# Patient Record
Sex: FEMALE | Race: WHITE | NOT HISPANIC OR LATINO | Employment: FULL TIME | URBAN - METROPOLITAN AREA
[De-identification: names, ages, dates, MRNs, and addresses within clinical notes are randomized per-mention and may not be internally consistent; named-entity substitution may affect disease eponyms.]

---

## 2023-04-03 PROBLEM — R42 DIZZINESS: Status: ACTIVE | Noted: 2023-04-03

## 2023-04-03 PROBLEM — G56.20 ULNAR NERVE IMPINGEMENT: Status: ACTIVE | Noted: 2023-04-03

## 2023-04-03 PROBLEM — M32.9 SLE (SYSTEMIC LUPUS ERYTHEMATOSUS) (MULTI): Status: ACTIVE | Noted: 2023-04-03

## 2023-04-03 PROBLEM — R92.8 ABNORMAL MAMMOGRAM: Status: ACTIVE | Noted: 2023-04-03

## 2023-04-03 PROBLEM — K04.7 TOOTH ABSCESS: Status: ACTIVE | Noted: 2023-04-03

## 2023-04-03 PROBLEM — H90.A31 MIXED CONDUCTIVE AND SENSORINEURAL HEARING LOSS OF RIGHT EAR WITH RESTRICTED HEARING OF LEFT EAR: Status: ACTIVE | Noted: 2023-04-03

## 2023-04-03 PROBLEM — H80.91 OTOSCLEROSIS OF RIGHT EAR: Status: ACTIVE | Noted: 2023-04-03

## 2023-04-03 PROBLEM — M79.643 HAND PAIN: Status: ACTIVE | Noted: 2023-04-03

## 2023-04-03 PROBLEM — U07.1 COVID-19: Status: ACTIVE | Noted: 2023-04-03

## 2023-04-03 PROBLEM — H11.32 SUBCONJUNCTIVAL HEMORRHAGE OF LEFT EYE: Status: ACTIVE | Noted: 2023-04-03

## 2023-04-03 PROBLEM — M54.12 CERVICAL RADICULOPATHY, ACUTE: Status: ACTIVE | Noted: 2023-04-03

## 2023-04-03 PROBLEM — E78.5 HYPERLIPIDEMIA: Status: ACTIVE | Noted: 2023-04-03

## 2023-04-03 PROBLEM — I10 BENIGN ESSENTIAL HYPERTENSION: Status: ACTIVE | Noted: 2023-04-03

## 2023-04-03 PROBLEM — M25.50 JOINT ACHE: Status: ACTIVE | Noted: 2023-04-03

## 2023-04-03 PROBLEM — J06.9 ACUTE UPPER RESPIRATORY INFECTION: Status: ACTIVE | Noted: 2023-04-03

## 2023-04-03 PROBLEM — J30.2 SEASONAL ALLERGIES: Status: ACTIVE | Noted: 2023-04-03

## 2023-04-03 PROBLEM — E03.9 HYPOTHYROIDISM: Status: ACTIVE | Noted: 2023-04-03

## 2023-04-03 PROBLEM — H90.11 CONDUCTIVE HEARING LOSS IN RIGHT EAR: Status: ACTIVE | Noted: 2023-04-03

## 2023-04-03 RX ORDER — CYCLOBENZAPRINE HCL 10 MG
1 TABLET ORAL NIGHTLY PRN
COMMUNITY
Start: 2019-03-20 | End: 2024-04-04 | Stop reason: SDUPTHER

## 2023-04-03 RX ORDER — LOSARTAN POTASSIUM 100 MG/1
1 TABLET ORAL DAILY
COMMUNITY
Start: 2020-02-27 | End: 2024-01-12

## 2023-04-03 RX ORDER — HYDROXYCHLOROQUINE SULFATE 200 MG/1
1 TABLET, FILM COATED ORAL 2 TIMES DAILY
COMMUNITY
Start: 2022-09-08 | End: 2023-10-16

## 2023-04-03 RX ORDER — OLOPATADINE HYDROCHLORIDE 1 MG/ML
SOLUTION/ DROPS OPHTHALMIC
COMMUNITY

## 2023-04-03 RX ORDER — LEVOTHYROXINE SODIUM 150 UG/1
TABLET ORAL
COMMUNITY
End: 2023-05-23

## 2023-04-03 RX ORDER — ESTRADIOL AND NORETHINDRONE ACETATE .5; .1 MG/1; MG/1
TABLET ORAL
COMMUNITY
End: 2023-10-26 | Stop reason: SDUPTHER

## 2023-04-03 RX ORDER — PHENTERMINE HYDROCHLORIDE 37.5 MG/1
1 TABLET ORAL DAILY
COMMUNITY
Start: 2021-05-11 | End: 2023-04-11 | Stop reason: ALTCHOICE

## 2023-04-03 RX ORDER — DOXYCYCLINE HYCLATE 50 MG/1
TABLET, FILM COATED ORAL 2 TIMES DAILY
COMMUNITY
End: 2023-06-27 | Stop reason: ALTCHOICE

## 2023-04-03 RX ORDER — AMOXICILLIN 500 MG/1
1 CAPSULE ORAL 3 TIMES DAILY
COMMUNITY
Start: 2022-12-02 | End: 2023-04-11 | Stop reason: ALTCHOICE

## 2023-04-03 RX ORDER — MULTIVITAMIN
1 TABLET ORAL DAILY
COMMUNITY
End: 2023-10-26 | Stop reason: ALTCHOICE

## 2023-04-03 RX ORDER — ATORVASTATIN CALCIUM 10 MG/1
1 TABLET, FILM COATED ORAL DAILY
COMMUNITY
Start: 2014-11-05 | End: 2023-09-18

## 2023-04-03 RX ORDER — ASPIRIN 81 MG/1
1 TABLET ORAL DAILY
COMMUNITY

## 2023-04-11 ENCOUNTER — OFFICE VISIT (OUTPATIENT)
Dept: PRIMARY CARE | Facility: CLINIC | Age: 58
End: 2023-04-11
Payer: COMMERCIAL

## 2023-04-11 VITALS
SYSTOLIC BLOOD PRESSURE: 138 MMHG | RESPIRATION RATE: 16 BRPM | WEIGHT: 202 LBS | OXYGEN SATURATION: 99 % | TEMPERATURE: 97.3 F | HEIGHT: 70 IN | HEART RATE: 62 BPM | BODY MASS INDEX: 28.92 KG/M2 | DIASTOLIC BLOOD PRESSURE: 80 MMHG

## 2023-04-11 DIAGNOSIS — E66.3 OVERWEIGHT (BMI 25.0-29.9): ICD-10-CM

## 2023-04-11 DIAGNOSIS — E78.2 MIXED HYPERLIPIDEMIA: Primary | ICD-10-CM

## 2023-04-11 DIAGNOSIS — I10 BENIGN ESSENTIAL HYPERTENSION: ICD-10-CM

## 2023-04-11 DIAGNOSIS — E03.9 HYPOTHYROIDISM, UNSPECIFIED TYPE: ICD-10-CM

## 2023-04-11 DIAGNOSIS — Z12.11 COLON CANCER SCREENING: ICD-10-CM

## 2023-04-11 DIAGNOSIS — M32.9 SYSTEMIC LUPUS ERYTHEMATOSUS, UNSPECIFIED SLE TYPE, UNSPECIFIED ORGAN INVOLVEMENT STATUS (MULTI): ICD-10-CM

## 2023-04-11 PROBLEM — R42 DIZZINESS: Status: RESOLVED | Noted: 2023-04-03 | Resolved: 2023-04-11

## 2023-04-11 PROBLEM — E78.00 PURE HYPERCHOLESTEROLEMIA: Status: RESOLVED | Noted: 2023-04-11 | Resolved: 2023-04-11

## 2023-04-11 PROBLEM — M25.50 JOINT ACHE: Status: RESOLVED | Noted: 2023-04-03 | Resolved: 2023-04-11

## 2023-04-11 PROBLEM — K04.7 TOOTH ABSCESS: Status: RESOLVED | Noted: 2023-04-03 | Resolved: 2023-04-11

## 2023-04-11 PROBLEM — U07.1 COVID-19: Status: RESOLVED | Noted: 2023-04-03 | Resolved: 2023-04-11

## 2023-04-11 PROBLEM — H11.32 SUBCONJUNCTIVAL HEMORRHAGE OF LEFT EYE: Status: RESOLVED | Noted: 2023-04-03 | Resolved: 2023-04-11

## 2023-04-11 PROBLEM — M79.643 HAND PAIN: Status: RESOLVED | Noted: 2023-04-03 | Resolved: 2023-04-11

## 2023-04-11 PROBLEM — M54.12 CERVICAL RADICULOPATHY, ACUTE: Status: RESOLVED | Noted: 2023-04-03 | Resolved: 2023-04-11

## 2023-04-11 PROBLEM — R92.8 ABNORMAL MAMMOGRAM: Status: RESOLVED | Noted: 2023-04-03 | Resolved: 2023-04-11

## 2023-04-11 PROBLEM — E78.00 PURE HYPERCHOLESTEROLEMIA: Status: ACTIVE | Noted: 2023-04-11

## 2023-04-11 PROBLEM — J06.9 ACUTE UPPER RESPIRATORY INFECTION: Status: RESOLVED | Noted: 2023-04-03 | Resolved: 2023-04-11

## 2023-04-11 PROCEDURE — 3079F DIAST BP 80-89 MM HG: CPT | Performed by: INTERNAL MEDICINE

## 2023-04-11 PROCEDURE — 99214 OFFICE O/P EST MOD 30 MIN: CPT | Performed by: INTERNAL MEDICINE

## 2023-04-11 PROCEDURE — 3075F SYST BP GE 130 - 139MM HG: CPT | Performed by: INTERNAL MEDICINE

## 2023-04-11 PROCEDURE — 1036F TOBACCO NON-USER: CPT | Performed by: INTERNAL MEDICINE

## 2023-04-11 RX ORDER — PHENTERMINE HYDROCHLORIDE 37.5 MG/1
37.5 TABLET ORAL DAILY
Qty: 30 TABLET | Refills: 0 | Status: SHIPPED | OUTPATIENT
Start: 2023-04-11 | End: 2023-04-13 | Stop reason: SDUPTHER

## 2023-04-11 ASSESSMENT — ENCOUNTER SYMPTOMS: NECK PAIN: 1

## 2023-04-11 ASSESSMENT — PATIENT HEALTH QUESTIONNAIRE - PHQ9
2. FEELING DOWN, DEPRESSED OR HOPELESS: NOT AT ALL
SUM OF ALL RESPONSES TO PHQ9 QUESTIONS 1 AND 2: 0
1. LITTLE INTEREST OR PLEASURE IN DOING THINGS: NOT AT ALL

## 2023-04-11 NOTE — PROGRESS NOTES
"Patient here for a follow up    Subjective   Patient ID: Gladys Norris is a 58 y.o. female who presents for Follow-up.  She is generally doing well today.    The patient has been taking hydroxychloroquine 200 mg BID since 10/2022 and has been tolerating the medication well.  She states that she is remembers to take her medication about \"90%\" of the time.  She has had to reschedule her appointment for an eye exam as her Specialist is retiring.  She continues to follow with  from Rheumatology.    The patient continues to take cyclobenzaprine 10mg as needed for neck pain, which is helping.     The patient had a suspected tooth abscess which was successfully treated with amoxicillin.    The patient would like to restart phentermine 37.5 mg once daily to assist with weight loss.     The patient has a prescription for a ProAir inhaler that she has had to use two times to date during a previous episode of Covid-19.      The patient is up to date with her Shingrix series vaccine and recalls experiencing malaise for two days after each dose.      Review of Systems   Musculoskeletal:  Positive for neck pain.   All other systems reviewed and are negative.      Objective   Physical Exam  Constitutional:       Appearance: Normal appearance.   Cardiovascular:      Rate and Rhythm: Normal rate and regular rhythm.      Heart sounds: Normal heart sounds.   Pulmonary:      Effort: Pulmonary effort is normal.      Breath sounds: Normal breath sounds.   Abdominal:      General: Bowel sounds are normal.      Palpations: Abdomen is soft.      Tenderness: There is no abdominal tenderness.   Skin:     General: Skin is warm and dry.   Neurological:      General: No focal deficit present.      Mental Status: She is alert and oriented to person, place, and time. Mental status is at baseline.   Psychiatric:         Mood and Affect: Mood normal.         Behavior: Behavior normal.       Assessment/Plan       IMPRESSION/PLAN :    "   Hypothyroid   - Last TSH in normal range at 0.45 - 07/2022, maintained on levothyroxine 150mcg QD. Ordered TSH.    HLD   - Stable, continue on atorvastatin 10mg QD.  Ordered lipid panel.    HTN   - /80 in office today, continue on Losartan 100mg 1/2 tab daily.      Carpal Tunnel Syndrome   - I have advised the patient to try to wear a carpal tunnel wrist splint to see if this helps with her symptom.      SLE   - Labs showed a weakly positive BENITA but Rheumatoid Factor, CRP, and citrulline antibody were unremarkable and ESR was cancelled by the lab on 7/2022. Started on hydroxychloroquine 200 mg BID on 10/3/2022, and experienced nausea w/o vomiting 1 week later. Follows up with  in Rheumatology.     Sinus Congestion   - Start xclear 2 sprays in each nostril once a day. Start Xyzal 5 mg 1 tablet QD.     Hematochezia   - Likely due to hemorrhoids and constipation. Monitor for now, and advised the patient to call the clinic if symptoms return. Patient is due for repeat colonoscopy in 2023.     Health Maintenance   - Routine labs 12/2022. Last Pap wnl 7/2022, repeat due 7/2023. Last Mammogram 1/2023. Last colonoscopy performed 12/2018, ordered repeat for 2023.     Follow up in 6 months, call sooner if needed.        Scribe Attestation  By signing my name below, IIbeth , Conner   attest that this documentation has been prepared under the direction and in the presence of Zaheer Buchanan DO.

## 2023-04-13 RX ORDER — PHENTERMINE HYDROCHLORIDE 37.5 MG/1
37.5 TABLET ORAL DAILY
Qty: 30 TABLET | Refills: 0 | Status: SHIPPED | OUTPATIENT
Start: 2023-04-13 | End: 2023-05-31 | Stop reason: SDUPTHER

## 2023-05-15 ENCOUNTER — APPOINTMENT (OUTPATIENT)
Dept: PRIMARY CARE | Facility: CLINIC | Age: 58
End: 2023-05-15
Payer: COMMERCIAL

## 2023-05-18 ENCOUNTER — LAB (OUTPATIENT)
Dept: LAB | Facility: LAB | Age: 58
End: 2023-05-18
Payer: COMMERCIAL

## 2023-05-18 DIAGNOSIS — E78.2 MIXED HYPERLIPIDEMIA: ICD-10-CM

## 2023-05-18 DIAGNOSIS — E03.9 HYPOTHYROIDISM, UNSPECIFIED TYPE: ICD-10-CM

## 2023-05-18 LAB
ANION GAP IN SER/PLAS: 13 MMOL/L (ref 10–20)
ANTI-DNA (DS): 9 IU/ML
BASOPHILS (10*3/UL) IN BLOOD BY AUTOMATED COUNT: 0.04 X10E9/L (ref 0–0.1)
BASOPHILS/100 LEUKOCYTES IN BLOOD BY AUTOMATED COUNT: 0.8 % (ref 0–2)
CALCIUM (MG/DL) IN SER/PLAS: 10 MG/DL (ref 8.6–10.3)
CARBON DIOXIDE, TOTAL (MMOL/L) IN SER/PLAS: 29 MMOL/L (ref 21–32)
CHLORIDE (MMOL/L) IN SER/PLAS: 104 MMOL/L (ref 98–107)
CHOLESTEROL (MG/DL) IN SER/PLAS: 184 MG/DL (ref 0–199)
CHOLESTEROL IN HDL (MG/DL) IN SER/PLAS: 79.5 MG/DL
CHOLESTEROL/HDL RATIO: 2.3
COMPLEMENT C3 (MG/DL) IN SER/PLAS: 158 MG/DL (ref 87–200)
COMPLEMENT C4 (MG/DL) IN SER/PLAS: 34 MG/DL (ref 10–50)
CREATININE (MG/DL) IN SER/PLAS: 1.06 MG/DL (ref 0.5–1.05)
CREATININE (MG/DL) IN URINE: 210 MG/DL (ref 20–320)
EOSINOPHILS (10*3/UL) IN BLOOD BY AUTOMATED COUNT: 0.17 X10E9/L (ref 0–0.7)
EOSINOPHILS/100 LEUKOCYTES IN BLOOD BY AUTOMATED COUNT: 3.6 % (ref 0–6)
ERYTHROCYTE DISTRIBUTION WIDTH (RATIO) BY AUTOMATED COUNT: 12.8 % (ref 11.5–14.5)
ERYTHROCYTE MEAN CORPUSCULAR HEMOGLOBIN CONCENTRATION (G/DL) BY AUTOMATED: 33.1 G/DL (ref 32–36)
ERYTHROCYTE MEAN CORPUSCULAR VOLUME (FL) BY AUTOMATED COUNT: 93 FL (ref 80–100)
ERYTHROCYTES (10*6/UL) IN BLOOD BY AUTOMATED COUNT: 4.65 X10E12/L (ref 4–5.2)
GFR FEMALE: 60 ML/MIN/1.73M2
GLUCOSE (MG/DL) IN SER/PLAS: 129 MG/DL (ref 74–99)
HEMATOCRIT (%) IN BLOOD BY AUTOMATED COUNT: 43.2 % (ref 36–46)
HEMOGLOBIN (G/DL) IN BLOOD: 14.3 G/DL (ref 12–16)
IMMATURE GRANULOCYTES/100 LEUKOCYTES IN BLOOD BY AUTOMATED COUNT: 0.4 % (ref 0–0.9)
LDL: 88 MG/DL (ref 0–99)
LEUKOCYTES (10*3/UL) IN BLOOD BY AUTOMATED COUNT: 4.8 X10E9/L (ref 4.4–11.3)
LYMPHOCYTES (10*3/UL) IN BLOOD BY AUTOMATED COUNT: 1.43 X10E9/L (ref 1.2–4.8)
LYMPHOCYTES/100 LEUKOCYTES IN BLOOD BY AUTOMATED COUNT: 30.1 % (ref 13–44)
MONOCYTES (10*3/UL) IN BLOOD BY AUTOMATED COUNT: 0.34 X10E9/L (ref 0.1–1)
MONOCYTES/100 LEUKOCYTES IN BLOOD BY AUTOMATED COUNT: 7.2 % (ref 2–10)
NEUTROPHILS (10*3/UL) IN BLOOD BY AUTOMATED COUNT: 2.75 X10E9/L (ref 1.2–7.7)
NEUTROPHILS/100 LEUKOCYTES IN BLOOD BY AUTOMATED COUNT: 57.9 % (ref 40–80)
PLATELETS (10*3/UL) IN BLOOD AUTOMATED COUNT: 335 X10E9/L (ref 150–450)
POTASSIUM (MMOL/L) IN SER/PLAS: 4 MMOL/L (ref 3.5–5.3)
PROTEIN (MG/DL) IN URINE: 18 MG/DL (ref 5–24)
PROTEIN/CREATININE (MG/MG) IN URINE: 0.09 MG/MG CREAT (ref 0–0.17)
SEDIMENTATION RATE, ERYTHROCYTE: 2 MM/H (ref 0–30)
SODIUM (MMOL/L) IN SER/PLAS: 142 MMOL/L (ref 136–145)
THYROTROPIN (MIU/L) IN SER/PLAS BY DETECTION LIMIT <= 0.05 MIU/L: 3.2 MIU/L (ref 0.44–3.98)
TRIGLYCERIDE (MG/DL) IN SER/PLAS: 82 MG/DL (ref 0–149)
UREA NITROGEN (MG/DL) IN SER/PLAS: 13 MG/DL (ref 6–23)
VLDL: 16 MG/DL (ref 0–40)

## 2023-05-18 PROCEDURE — 84443 ASSAY THYROID STIM HORMONE: CPT

## 2023-05-18 PROCEDURE — 36415 COLL VENOUS BLD VENIPUNCTURE: CPT

## 2023-05-18 PROCEDURE — 80061 LIPID PANEL: CPT

## 2023-05-23 ENCOUNTER — OFFICE VISIT (OUTPATIENT)
Dept: PRIMARY CARE | Facility: CLINIC | Age: 58
End: 2023-05-23
Payer: COMMERCIAL

## 2023-05-23 VITALS
BODY MASS INDEX: 29.4 KG/M2 | TEMPERATURE: 97.6 F | SYSTOLIC BLOOD PRESSURE: 148 MMHG | OXYGEN SATURATION: 99 % | RESPIRATION RATE: 16 BRPM | HEART RATE: 67 BPM | WEIGHT: 202 LBS | DIASTOLIC BLOOD PRESSURE: 70 MMHG

## 2023-05-23 DIAGNOSIS — E66.3 OVERWEIGHT (BMI 25.0-29.9): ICD-10-CM

## 2023-05-23 DIAGNOSIS — R73.9 HYPERGLYCEMIA: ICD-10-CM

## 2023-05-23 DIAGNOSIS — E03.9 HYPOTHYROIDISM, UNSPECIFIED TYPE: Primary | ICD-10-CM

## 2023-05-23 PROCEDURE — 1036F TOBACCO NON-USER: CPT | Performed by: INTERNAL MEDICINE

## 2023-05-23 PROCEDURE — 99214 OFFICE O/P EST MOD 30 MIN: CPT | Performed by: INTERNAL MEDICINE

## 2023-05-23 PROCEDURE — 3077F SYST BP >= 140 MM HG: CPT | Performed by: INTERNAL MEDICINE

## 2023-05-23 PROCEDURE — 3078F DIAST BP <80 MM HG: CPT | Performed by: INTERNAL MEDICINE

## 2023-05-23 RX ORDER — LEVOTHYROXINE SODIUM 175 UG/1
175 TABLET ORAL DAILY
Qty: 30 TABLET | Refills: 11 | Status: SHIPPED | OUTPATIENT
Start: 2023-05-23 | End: 2023-05-23 | Stop reason: SDUPTHER

## 2023-05-23 RX ORDER — LEVOTHYROXINE SODIUM 175 UG/1
175 TABLET ORAL DAILY
Qty: 30 TABLET | Refills: 11 | Status: SHIPPED | OUTPATIENT
Start: 2023-05-23 | End: 2023-06-15

## 2023-05-23 NOTE — PROGRESS NOTES
Patient here for a 1 month follow up    Subjective   Patient ID: Gladys Norris is a 58 y.o. female who presents for Follow-up.    The patient is concerned about a weight gain of 8lbs since 12/2022 which she believes may be related to hypothyroidism.  Her last TSH from 5/18/2023 was within the normal range although it did increase by three points when compared to previous.  She is maintained with levothyroxine 150 mcg every day and has been tolerating this dosage well.    The patient recalls she was fasting prior to completing her most recent BMP which showed a FBG of 129 mg/dL on 5/18/2023      Review of Systems   All other systems reviewed and are negative.      Objective   Physical Exam  Constitutional:       Appearance: Normal appearance.   Cardiovascular:      Rate and Rhythm: Normal rate and regular rhythm.      Heart sounds: Normal heart sounds.   Pulmonary:      Effort: Pulmonary effort is normal.      Breath sounds: Normal breath sounds.   Abdominal:      General: Bowel sounds are normal.      Palpations: Abdomen is soft.      Tenderness: There is no abdominal tenderness.   Skin:     General: Skin is warm and dry.   Neurological:      General: No focal deficit present.      Mental Status: She is alert and oriented to person, place, and time. Mental status is at baseline.   Psychiatric:         Mood and Affect: Mood normal.         Behavior: Behavior normal.         Assessment/Plan       IMPRESSION/PLAN :      Hypothyroid   - Last TSH in normal range at 3.45 - 5/2023.  Increased by nearly 3.0 points since last reading.  Increase levothyroxine from 150mcg to 175 mcg every day.  Ordered repeat TSH.     Hyperglycemia  - Last  mg/dL per 5/18/2023.  Ordered repeat BMP to be completed in the fasting state and HbA1c.    HTN   - /70 in office today, continue on Losartan 100mg 1/2 tab daily.      HLD   - Stable, continue on atorvastatin 10mg QD.  Ordered lipid panel.    Carpal Tunnel Syndrome   - I have  advised the patient to try to wear a carpal tunnel wrist splint to see if this helps with her symptom.      SLE   - Labs showed a weakly positive BENITA but Rheumatoid Factor, CRP, and citrulline antibody were unremarkable and ESR was cancelled by the lab on 7/2022. Started on hydroxychloroquine 200 mg BID on 10/3/2022, and experienced nausea w/o vomiting 1 week later. Follows up with  in Rheumatology.     Sinus Congestion   - Start xclear 2 sprays in each nostril once a day. Start Xyzal 5 mg 1 tablet QD.      Hematochezia   - Likely due to hemorrhoids and constipation. Monitor for now, and advised the patient to call the clinic if symptoms return. Patient is due for repeat colonoscopy in 2023.     Health Maintenance   - Routine labs 5/2023. Last Pap wnl 7/2022, repeat due 7/2023. Last Mammogram 1/2023. Last colonoscopy performed 12/2018, ordered repeat for 2023.     Follow up in 6 months, call sooner if needed.        Scribe Attestation  By signing my name below, I, Conner Daniels   attest that this documentation has been prepared under the direction and in the presence of Zaheer Buchanan DO.

## 2023-05-31 DIAGNOSIS — E66.3 OVERWEIGHT (BMI 25.0-29.9): ICD-10-CM

## 2023-05-31 RX ORDER — PHENTERMINE HYDROCHLORIDE 37.5 MG/1
37.5 TABLET ORAL DAILY
Qty: 30 TABLET | Refills: 0 | Status: SHIPPED | OUTPATIENT
Start: 2023-05-31 | End: 2023-07-25 | Stop reason: ALTCHOICE

## 2023-06-15 DIAGNOSIS — E03.9 HYPOTHYROIDISM, UNSPECIFIED TYPE: ICD-10-CM

## 2023-06-15 RX ORDER — LEVOTHYROXINE SODIUM 175 UG/1
175 TABLET ORAL DAILY
Qty: 30 TABLET | Refills: 11 | Status: SHIPPED | OUTPATIENT
Start: 2023-06-15 | End: 2023-07-31

## 2023-06-23 ENCOUNTER — APPOINTMENT (OUTPATIENT)
Dept: PRIMARY CARE | Facility: CLINIC | Age: 58
End: 2023-06-23
Payer: COMMERCIAL

## 2023-06-27 ENCOUNTER — OFFICE VISIT (OUTPATIENT)
Dept: PRIMARY CARE | Facility: CLINIC | Age: 58
End: 2023-06-27
Payer: COMMERCIAL

## 2023-06-27 VITALS
BODY MASS INDEX: 28.92 KG/M2 | DIASTOLIC BLOOD PRESSURE: 72 MMHG | TEMPERATURE: 97.2 F | HEIGHT: 70 IN | HEART RATE: 61 BPM | RESPIRATION RATE: 16 BRPM | OXYGEN SATURATION: 96 % | SYSTOLIC BLOOD PRESSURE: 124 MMHG | WEIGHT: 202 LBS

## 2023-06-27 DIAGNOSIS — M32.9 SYSTEMIC LUPUS ERYTHEMATOSUS, UNSPECIFIED SLE TYPE, UNSPECIFIED ORGAN INVOLVEMENT STATUS (MULTI): Primary | ICD-10-CM

## 2023-06-27 PROCEDURE — 1036F TOBACCO NON-USER: CPT | Performed by: INTERNAL MEDICINE

## 2023-06-27 PROCEDURE — 99213 OFFICE O/P EST LOW 20 MIN: CPT | Performed by: INTERNAL MEDICINE

## 2023-06-27 PROCEDURE — 3078F DIAST BP <80 MM HG: CPT | Performed by: INTERNAL MEDICINE

## 2023-06-27 PROCEDURE — 3074F SYST BP LT 130 MM HG: CPT | Performed by: INTERNAL MEDICINE

## 2023-06-27 RX ORDER — CHOLECALCIFEROL (VITAMIN D3) 25 MCG
25 TABLET ORAL DAILY
COMMUNITY

## 2023-06-27 ASSESSMENT — PATIENT HEALTH QUESTIONNAIRE - PHQ9
2. FEELING DOWN, DEPRESSED OR HOPELESS: NOT AT ALL
1. LITTLE INTEREST OR PLEASURE IN DOING THINGS: NOT AT ALL
SUM OF ALL RESPONSES TO PHQ9 QUESTIONS 1 AND 2: 0

## 2023-06-27 ASSESSMENT — ENCOUNTER SYMPTOMS
DIARRHEA: 0
CONSTIPATION: 0

## 2023-06-27 NOTE — PROGRESS NOTES
Subjective   Patient ID: Gladys Norris is a 58 y.o. female who presents for Follow-up (1 month follow up).    The patient has not been taking phentermine as she is concerned about elevated creatinine of 1.06 in 5/2023.  She would like to take the medication with reassurance that her renal function is stable.      The patient has been taking the increased dosage of levothyroxine at 175 mcg once daily, and states that the intermittent constipation has resolved.  She denies any bowel problems and notes she is due for a colonoscopy in 12/2023.      Review of Systems   Gastrointestinal:  Negative for constipation and diarrhea.     Objective   Physical Exam  Constitutional:       Appearance: Normal appearance.   Cardiovascular:      Rate and Rhythm: Normal rate and regular rhythm.      Heart sounds: Normal heart sounds.   Pulmonary:      Effort: Pulmonary effort is normal.      Breath sounds: Normal breath sounds.   Abdominal:      General: Bowel sounds are normal.      Palpations: Abdomen is soft.      Tenderness: There is no abdominal tenderness.   Skin:     General: Skin is warm and dry.   Neurological:      General: No focal deficit present.      Mental Status: She is alert and oriented to person, place, and time. Mental status is at baseline.   Psychiatric:         Mood and Affect: Mood normal.         Behavior: Behavior normal.       Assessment/Plan   Problem List Items Addressed This Visit    None      IMPRESSION/PLAN :      HTN   - /72 in office today, continue on Losartan 100mg 1/2 tab daily.      HLD   - Stable, continue on atorvastatin 10mg QD.  Ordered lipid panel.    Hypothyroid   - Last TSH in normal range at 3.45 - 5/2023.  Increased by nearly 3.0 points since last reading.  Increase levothyroxine from 150mcg to 175 mcg every day.  Previously ordered repeat TSH.     Hyperglycemia  - Last  mg/dL per 5/18/2023.  Previously ordered repeat BMP to be completed in the fasting state and HbA1c.      Carpal Tunnel Syndrome   - I have advised the patient to try to wear a carpal tunnel wrist splint to see if this helps with her symptom.      SLE   - Labs showed a weakly positive BENITA but Rheumatoid Factor, CRP, and citrulline antibody were unremarkable and ESR was cancelled by the lab on 7/2022. Started on hydroxychloroquine 200 mg BID on 10/3/2022, and experienced nausea w/o vomiting 1 week later. Follows up with  in Rheumatology.     Sinus Congestion   - Start xclear 2 sprays in each nostril once a day. Start Xyzal 5 mg 1 tablet QD.      Hematochezia   - Likely due to hemorrhoids and constipation. Monitor for now, and advised the patient to call the clinic if symptoms return. Patient is due for repeat colonoscopy in 2023.     Health Maintenance   - Routine labs 5/2023. Last Pap wnl 7/2022, repeat due 7/2023. Last Mammogram 1/2023. Last colonoscopy performed 12/2018, ordered repeat for 2023.     Follow up in 6 months, call sooner if needed.        Scribe Attestation  By signing my name below, I, Conner Daniels   attest that this documentation has been prepared under the direction and in the presence of Zaheer Buchanan DO.

## 2023-07-25 ENCOUNTER — OFFICE VISIT (OUTPATIENT)
Dept: PRIMARY CARE | Facility: CLINIC | Age: 58
End: 2023-07-25
Payer: COMMERCIAL

## 2023-07-25 VITALS
WEIGHT: 202 LBS | OXYGEN SATURATION: 98 % | HEART RATE: 70 BPM | TEMPERATURE: 97.8 F | BODY MASS INDEX: 29.4 KG/M2 | DIASTOLIC BLOOD PRESSURE: 80 MMHG | RESPIRATION RATE: 16 BRPM | SYSTOLIC BLOOD PRESSURE: 128 MMHG

## 2023-07-25 DIAGNOSIS — M25.552 LEFT HIP PAIN: Primary | ICD-10-CM

## 2023-07-25 DIAGNOSIS — M25.561 ACUTE PAIN OF BOTH KNEES: ICD-10-CM

## 2023-07-25 DIAGNOSIS — M25.562 ACUTE PAIN OF BOTH KNEES: ICD-10-CM

## 2023-07-25 PROCEDURE — 3079F DIAST BP 80-89 MM HG: CPT | Performed by: INTERNAL MEDICINE

## 2023-07-25 PROCEDURE — 99396 PREV VISIT EST AGE 40-64: CPT | Performed by: INTERNAL MEDICINE

## 2023-07-25 PROCEDURE — 3074F SYST BP LT 130 MM HG: CPT | Performed by: INTERNAL MEDICINE

## 2023-07-25 PROCEDURE — 1036F TOBACCO NON-USER: CPT | Performed by: INTERNAL MEDICINE

## 2023-07-25 ASSESSMENT — ENCOUNTER SYMPTOMS
FREQUENCY: 0
DIARRHEA: 0
CONSTIPATION: 0

## 2023-07-25 NOTE — PROGRESS NOTES
Patient here for a physical     Subjective   Patient ID: Gladys Norris is a 58 y.o. female who presents for Annual Exam.    The patient reports left hip pain and bilateral knee pain, which is also worse on the left side, particularly when standing from a sitting position on the floor.  She spends a significant amount of time standing while at work, and finds that pain is worse when she goes home in the evening.  The patient suspects that the knee pain is likely due to muscle weakness.  She has tried using topical Voltaren gel with little to no relief.  The patient intends to follow-up with  from Rheumatology.      The patient has stopped taking phentermine 37.5 mg once daily.  She has been taking levothyroxine 175 mcg once daily and finds that this increased dosage is helping.      The patient denies any vision changes and follows regularly with Ophthalmology.  She denies any bowel problems or urinary symptoms.       Review of Systems   Eyes:  Negative for visual disturbance.   Gastrointestinal:  Negative for constipation and diarrhea.   Genitourinary:  Negative for frequency.   Musculoskeletal:         Positive for left hip pain, bilateral knee pain.      Objective   Physical Exam  Constitutional:       Appearance: Normal appearance.   Cardiovascular:      Rate and Rhythm: Normal rate and regular rhythm.      Heart sounds: Normal heart sounds.   Pulmonary:      Effort: Pulmonary effort is normal.      Breath sounds: Normal breath sounds.   Abdominal:      General: Bowel sounds are normal.      Palpations: Abdomen is soft.      Tenderness: There is no abdominal tenderness.   Skin:     General: Skin is warm and dry.   Neurological:      General: No focal deficit present.      Mental Status: She is alert and oriented to person, place, and time. Mental status is at baseline.   Psychiatric:         Mood and Affect: Mood normal.         Behavior: Behavior normal.       Assessment/Plan   Problem List Items  Addressed This Visit    None  Visit Diagnoses       Left hip pain    -  Primary    Relevant Orders    XR hip left 2 or 3 views    Acute pain of both knees        Relevant Orders    Referral to Physical Therapy            CPE Performed  -  Discussed healthy diet and regular exercise.    -  Physical exam overall unremarkable. Immunizations reviewed and updated accordingly. Healthy lifestyle choices discussed (tobacco avoidance, appropriate alcohol use, avoidance of illicit substances).   -  Patient is wearing seatbelt.   -  Screening lab work ordered as indicated.    -  Age appropriate screening tests reviewed with patient.        IMPRESSION/PLAN :      Left Hip Pain  - Ordered Xray and referral to Physical Therapy.      Bilateral Knee Pain/Weakness  - Ordered referral to physical thearpy.    HTN   - /80 in office today, continue on Losartan 100mg 1/2 tab daily.      HLD   - Stable, continue on atorvastatin 10mg QD.       Hypothyroid   - Last TSH in normal range at 3.45 - 5/2023.  Increased by nearly 3.0 points since last reading.  Increase levothyroxine from 150mcg to 175 mcg every day.  Previously ordered TSH, pending.     Hyperglycemia  - Last  mg/dL per 5/18/2023.  Previously ordered repeat BMP to be completed in the fasting state and HbA1c.     Carpal Tunnel Syndrome   - I have advised the patient to try to wear a carpal tunnel wrist splint to see if this helps with her symptom.      SLE   - Labs showed a weakly positive BENITA but Rheumatoid Factor, CRP, and citrulline antibody were unremarkable and ESR was cancelled by the lab on 7/2022. Started on hydroxychloroquine 200 mg BID on 10/3/2022, and experienced nausea w/o vomiting 1 week later. Follows up with  in Rheumatology.     Hematochezia   - Likely due to hemorrhoids and constipation. Monitor for now, and advised the patient to call the clinic if symptoms return. Patient is due for repeat colonoscopy in 2023.     Health Maintenance   -  Routine labs 5/2023. Last Pap wnl 7/2022, repeat due 7/2023. Last Mammogram 1/2023. Last colonoscopy performed 12/2018, previously ordered repeat for 2023.     Follow up in 6 months, call sooner if needed.        Scribe Attestation  By signing my name below, I, Ibeth Carrera, Scribe   attest that this documentation has been prepared under the direction and in the presence of Zaheer Buchanan DO.

## 2023-07-29 DIAGNOSIS — E03.9 HYPOTHYROIDISM, UNSPECIFIED TYPE: ICD-10-CM

## 2023-07-31 RX ORDER — LEVOTHYROXINE SODIUM 175 UG/1
175 TABLET ORAL DAILY
Qty: 30 TABLET | Refills: 0 | Status: SHIPPED | OUTPATIENT
Start: 2023-07-31 | End: 2023-08-04 | Stop reason: SDUPTHER

## 2023-08-03 DIAGNOSIS — E03.9 HYPOTHYROIDISM, UNSPECIFIED TYPE: ICD-10-CM

## 2023-08-03 RX ORDER — LEVOTHYROXINE SODIUM 175 UG/1
175 TABLET ORAL DAILY
Qty: 30 TABLET | Refills: 0 | Status: CANCELLED | OUTPATIENT
Start: 2023-08-03 | End: 2024-08-02

## 2023-08-04 DIAGNOSIS — E03.9 HYPOTHYROIDISM, UNSPECIFIED TYPE: ICD-10-CM

## 2023-08-04 RX ORDER — LEVOTHYROXINE SODIUM 175 UG/1
175 TABLET ORAL DAILY
Qty: 90 TABLET | Refills: 0 | Status: SHIPPED | OUTPATIENT
Start: 2023-08-04 | End: 2023-11-20 | Stop reason: SDUPTHER

## 2023-08-05 NOTE — RESULT ENCOUNTER NOTE
Gladys    Thank you for doing the x-ray of the left hip.  The radiologist confirms that there is mild arthritis in this hip.  Please continue the measures that you and Dr. Buchanan discussed at your recent visit regarding your hip and other joint symptoms, and call with any additional concerns.    Sincerely,    Jerrell Winkler MD   - covering physician for Dr. Buchanan

## 2023-08-09 ENCOUNTER — LAB (OUTPATIENT)
Dept: LAB | Facility: LAB | Age: 58
End: 2023-08-09
Payer: COMMERCIAL

## 2023-08-09 DIAGNOSIS — E03.9 HYPOTHYROIDISM, UNSPECIFIED TYPE: ICD-10-CM

## 2023-08-09 DIAGNOSIS — R73.9 HYPERGLYCEMIA: ICD-10-CM

## 2023-08-09 LAB
ANION GAP IN SER/PLAS: 12 MMOL/L (ref 10–20)
BASOPHILS (10*3/UL) IN BLOOD BY AUTOMATED COUNT: 0.03 X10E9/L (ref 0–0.1)
BASOPHILS/100 LEUKOCYTES IN BLOOD BY AUTOMATED COUNT: 0.7 % (ref 0–2)
CALCIUM (MG/DL) IN SER/PLAS: 9.6 MG/DL (ref 8.6–10.3)
CARBON DIOXIDE, TOTAL (MMOL/L) IN SER/PLAS: 29 MMOL/L (ref 21–32)
CHLORIDE (MMOL/L) IN SER/PLAS: 106 MMOL/L (ref 98–107)
CREATININE (MG/DL) IN SER/PLAS: 0.99 MG/DL (ref 0.5–1.05)
CREATININE (MG/DL) IN URINE: 161 MG/DL (ref 20–320)
EOSINOPHILS (10*3/UL) IN BLOOD BY AUTOMATED COUNT: 0.17 X10E9/L (ref 0–0.7)
EOSINOPHILS/100 LEUKOCYTES IN BLOOD BY AUTOMATED COUNT: 3.8 % (ref 0–6)
ERYTHROCYTE DISTRIBUTION WIDTH (RATIO) BY AUTOMATED COUNT: 12.7 % (ref 11.5–14.5)
ERYTHROCYTE MEAN CORPUSCULAR HEMOGLOBIN CONCENTRATION (G/DL) BY AUTOMATED: 32.9 G/DL (ref 32–36)
ERYTHROCYTE MEAN CORPUSCULAR VOLUME (FL) BY AUTOMATED COUNT: 94 FL (ref 80–100)
ERYTHROCYTES (10*6/UL) IN BLOOD BY AUTOMATED COUNT: 4.35 X10E12/L (ref 4–5.2)
ESTIMATED AVERAGE GLUCOSE FOR HBA1C: 117 MG/DL
GFR FEMALE: 66 ML/MIN/1.73M2
GLUCOSE (MG/DL) IN SER/PLAS: 105 MG/DL (ref 74–99)
HEMATOCRIT (%) IN BLOOD BY AUTOMATED COUNT: 40.7 % (ref 36–46)
HEMOGLOBIN (G/DL) IN BLOOD: 13.4 G/DL (ref 12–16)
HEMOGLOBIN A1C/HEMOGLOBIN TOTAL IN BLOOD: 5.7 %
IMMATURE GRANULOCYTES/100 LEUKOCYTES IN BLOOD BY AUTOMATED COUNT: 0 % (ref 0–0.9)
LEUKOCYTES (10*3/UL) IN BLOOD BY AUTOMATED COUNT: 4.4 X10E9/L (ref 4.4–11.3)
LYMPHOCYTES (10*3/UL) IN BLOOD BY AUTOMATED COUNT: 1.25 X10E9/L (ref 1.2–4.8)
LYMPHOCYTES/100 LEUKOCYTES IN BLOOD BY AUTOMATED COUNT: 28.2 % (ref 13–44)
MONOCYTES (10*3/UL) IN BLOOD BY AUTOMATED COUNT: 0.35 X10E9/L (ref 0.1–1)
MONOCYTES/100 LEUKOCYTES IN BLOOD BY AUTOMATED COUNT: 7.9 % (ref 2–10)
NEUTROPHILS (10*3/UL) IN BLOOD BY AUTOMATED COUNT: 2.64 X10E9/L (ref 1.2–7.7)
NEUTROPHILS/100 LEUKOCYTES IN BLOOD BY AUTOMATED COUNT: 59.4 % (ref 40–80)
PLATELETS (10*3/UL) IN BLOOD AUTOMATED COUNT: 320 X10E9/L (ref 150–450)
POTASSIUM (MMOL/L) IN SER/PLAS: 4.5 MMOL/L (ref 3.5–5.3)
PROTEIN (MG/DL) IN URINE: 16 MG/DL (ref 5–24)
PROTEIN/CREATININE (MG/MG) IN URINE: 0.1 MG/MG CREAT (ref 0–0.17)
SEDIMENTATION RATE, ERYTHROCYTE: 2 MM/H (ref 0–30)
SODIUM (MMOL/L) IN SER/PLAS: 142 MMOL/L (ref 136–145)
THYROTROPIN (MIU/L) IN SER/PLAS BY DETECTION LIMIT <= 0.05 MIU/L: 1.76 MIU/L (ref 0.44–3.98)
UREA NITROGEN (MG/DL) IN SER/PLAS: 13 MG/DL (ref 6–23)

## 2023-08-09 PROCEDURE — 84443 ASSAY THYROID STIM HORMONE: CPT

## 2023-08-09 PROCEDURE — 83036 HEMOGLOBIN GLYCOSYLATED A1C: CPT

## 2023-08-09 PROCEDURE — 36415 COLL VENOUS BLD VENIPUNCTURE: CPT

## 2023-08-09 PROCEDURE — 80048 BASIC METABOLIC PNL TOTAL CA: CPT

## 2023-08-18 DIAGNOSIS — M25.562 ACUTE PAIN OF LEFT KNEE: Primary | ICD-10-CM

## 2023-08-18 RX ORDER — METHYLPREDNISOLONE 4 MG/1
TABLET ORAL
Qty: 21 TABLET | Refills: 0 | Status: SHIPPED | OUTPATIENT
Start: 2023-08-18 | End: 2023-09-01 | Stop reason: SDUPTHER

## 2023-09-01 DIAGNOSIS — M25.562 ACUTE PAIN OF LEFT KNEE: ICD-10-CM

## 2023-09-01 RX ORDER — METHYLPREDNISOLONE 4 MG/1
TABLET ORAL
Qty: 21 TABLET | Refills: 1 | Status: SHIPPED | OUTPATIENT
Start: 2023-09-01 | End: 2023-09-08

## 2023-09-18 DIAGNOSIS — E78.2 MIXED HYPERLIPIDEMIA: Primary | ICD-10-CM

## 2023-09-18 RX ORDER — ATORVASTATIN CALCIUM 10 MG/1
10 TABLET, FILM COATED ORAL DAILY
Qty: 90 TABLET | Refills: 3 | Status: SHIPPED | OUTPATIENT
Start: 2023-09-18

## 2023-10-16 DIAGNOSIS — M32.9 SYSTEMIC LUPUS ERYTHEMATOSUS, UNSPECIFIED SLE TYPE, UNSPECIFIED ORGAN INVOLVEMENT STATUS (MULTI): Primary | ICD-10-CM

## 2023-10-16 RX ORDER — HYDROXYCHLOROQUINE SULFATE 200 MG/1
TABLET, FILM COATED ORAL 2 TIMES DAILY
Qty: 180 TABLET | Refills: 1 | Status: SHIPPED | OUTPATIENT
Start: 2023-10-16 | End: 2024-04-11

## 2023-10-23 DIAGNOSIS — E03.9 HYPOTHYROIDISM, UNSPECIFIED TYPE: Primary | ICD-10-CM

## 2023-10-26 ENCOUNTER — OFFICE VISIT (OUTPATIENT)
Dept: OBSTETRICS AND GYNECOLOGY | Facility: CLINIC | Age: 58
End: 2023-10-26
Payer: COMMERCIAL

## 2023-10-26 VITALS
HEIGHT: 69 IN | SYSTOLIC BLOOD PRESSURE: 120 MMHG | DIASTOLIC BLOOD PRESSURE: 78 MMHG | BODY MASS INDEX: 29.92 KG/M2 | WEIGHT: 202 LBS

## 2023-10-26 DIAGNOSIS — N95.1 MENOPAUSAL SYMPTOM: ICD-10-CM

## 2023-10-26 DIAGNOSIS — Z01.419 WELL WOMAN EXAM: Primary | ICD-10-CM

## 2023-10-26 DIAGNOSIS — Z12.31 SCREENING MAMMOGRAM FOR BREAST CANCER: ICD-10-CM

## 2023-10-26 PROCEDURE — 1036F TOBACCO NON-USER: CPT | Performed by: OBSTETRICS & GYNECOLOGY

## 2023-10-26 PROCEDURE — 99396 PREV VISIT EST AGE 40-64: CPT | Performed by: OBSTETRICS & GYNECOLOGY

## 2023-10-26 PROCEDURE — 3078F DIAST BP <80 MM HG: CPT | Performed by: OBSTETRICS & GYNECOLOGY

## 2023-10-26 PROCEDURE — 3074F SYST BP LT 130 MM HG: CPT | Performed by: OBSTETRICS & GYNECOLOGY

## 2023-10-26 RX ORDER — ESTRADIOL AND NORETHINDRONE ACETATE .5; .1 MG/1; MG/1
1 TABLET ORAL DAILY
Qty: 90 TABLET | Refills: 3 | Status: SHIPPED | OUTPATIENT
Start: 2023-10-26

## 2023-10-26 ASSESSMENT — ENCOUNTER SYMPTOMS
HEMATOLOGIC/LYMPHATIC NEGATIVE: 1
EYES NEGATIVE: 1
CONSTITUTIONAL NEGATIVE: 1
GASTROINTESTINAL NEGATIVE: 1
CARDIOVASCULAR NEGATIVE: 1
NEUROLOGICAL NEGATIVE: 1
MUSCULOSKELETAL NEGATIVE: 1
ALLERGIC/IMMUNOLOGIC NEGATIVE: 1
RESPIRATORY NEGATIVE: 1
PSYCHIATRIC NEGATIVE: 1
ENDOCRINE NEGATIVE: 1

## 2023-10-26 NOTE — PROGRESS NOTES
Subjective   Patient ID: Gladys Norris is a 58 y.o. female who presents for Annual Exam (LENKA//LAST PAP: 07/28/2022- WNL -HPV/LAST MAMM: 01/26/2023//Chaperone Declined, Mitali Gutierrez, MAII/).  Would like to continue hormone therapy for now.  Health is good.  Kids are in town. Son having a baby.  Just started exercise program.  Sex is not so much since  broke his back. She is ok with it.   Work is the same.        Review of Systems   Constitutional: Negative.    HENT: Negative.     Eyes: Negative.    Respiratory: Negative.     Cardiovascular: Negative.    Gastrointestinal: Negative.    Endocrine: Negative.    Genitourinary: Negative.    Musculoskeletal: Negative.    Skin: Negative.    Allergic/Immunologic: Negative.    Neurological: Negative.    Hematological: Negative.    Psychiatric/Behavioral: Negative.         Objective   Physical Exam  Constitutional:       Appearance: Normal appearance. She is normal weight.   HENT:      Head: Normocephalic.   Neck:      Thyroid: No thyroid mass or thyromegaly.   Pulmonary:      Effort: Pulmonary effort is normal.   Chest:      Chest wall: No mass.   Breasts:     Right: Normal.      Left: Normal.   Abdominal:      Palpations: Abdomen is soft.   Genitourinary:     General: Normal vulva.      Exam position: Lithotomy position.      Vagina: Normal.      Cervix: Normal and dilated.      Uterus: Normal.       Adnexa: Right adnexa normal and left adnexa normal.      Comments: Normal vagina  Normal cervix  Normal uterus  Normal adnexa  Musculoskeletal:         General: Normal range of motion.      Cervical back: Normal range of motion and neck supple.   Lymphadenopathy:      Upper Body:      Right upper body: No supraclavicular or axillary adenopathy.      Left upper body: No supraclavicular or axillary adenopathy.   Skin:     General: Skin is warm and dry.   Neurological:      General: No focal deficit present.      Mental Status: She is alert.   Psychiatric:         Mood and  Affect: Mood normal.         Behavior: Behavior normal.         Thought Content: Thought content normal.         Judgment: Judgment normal.       Assessment/Plan

## 2023-11-06 ENCOUNTER — ANESTHESIA (OUTPATIENT)
Dept: OPERATING ROOM | Facility: CLINIC | Age: 58
End: 2023-11-06
Payer: COMMERCIAL

## 2023-11-06 ENCOUNTER — HOSPITAL ENCOUNTER (OUTPATIENT)
Dept: OPERATING ROOM | Facility: CLINIC | Age: 58
Setting detail: OUTPATIENT SURGERY
Discharge: HOME | End: 2023-11-06
Payer: COMMERCIAL

## 2023-11-06 ENCOUNTER — ANESTHESIA EVENT (OUTPATIENT)
Dept: OPERATING ROOM | Facility: CLINIC | Age: 58
End: 2023-11-06
Payer: COMMERCIAL

## 2023-11-06 VITALS
DIASTOLIC BLOOD PRESSURE: 77 MMHG | TEMPERATURE: 97.9 F | HEART RATE: 55 BPM | SYSTOLIC BLOOD PRESSURE: 130 MMHG | RESPIRATION RATE: 16 BRPM | HEIGHT: 70 IN | WEIGHT: 200.62 LBS | BODY MASS INDEX: 28.72 KG/M2 | OXYGEN SATURATION: 98 %

## 2023-11-06 DIAGNOSIS — Z12.11 COLON CANCER SCREENING: Primary | ICD-10-CM

## 2023-11-06 DIAGNOSIS — Z83.710 FAMILY HISTORY OF ADENOMATOUS AND SERRATED POLYPS: ICD-10-CM

## 2023-11-06 PROCEDURE — 45378 DIAGNOSTIC COLONOSCOPY: CPT | Performed by: INTERNAL MEDICINE

## 2023-11-06 PROCEDURE — 2500000004 HC RX 250 GENERAL PHARMACY W/ HCPCS (ALT 636 FOR OP/ED): Performed by: ANESTHESIOLOGIST ASSISTANT

## 2023-11-06 PROCEDURE — A45378 PR COLONOSCOPY,DIAGNOSTIC: Performed by: ANESTHESIOLOGIST ASSISTANT

## 2023-11-06 PROCEDURE — 7100000009 HC PHASE TWO TIME - INITIAL BASE CHARGE: Performed by: ANESTHESIOLOGY

## 2023-11-06 PROCEDURE — 3700000001 HC GENERAL ANESTHESIA TIME - INITIAL BASE CHARGE: Performed by: ANESTHESIOLOGY

## 2023-11-06 PROCEDURE — 3600000007 HC OR TIME - EACH INCREMENTAL 1 MINUTE - PROCEDURE LEVEL TWO: Performed by: ANESTHESIOLOGY

## 2023-11-06 PROCEDURE — A45378 PR COLONOSCOPY,DIAGNOSTIC: Performed by: ANESTHESIOLOGY

## 2023-11-06 PROCEDURE — 7100000010 HC PHASE TWO TIME - EACH INCREMENTAL 1 MINUTE: Performed by: ANESTHESIOLOGY

## 2023-11-06 PROCEDURE — 3600000002 HC OR TIME - INITIAL BASE CHARGE - PROCEDURE LEVEL TWO: Performed by: ANESTHESIOLOGY

## 2023-11-06 PROCEDURE — 3700000002 HC GENERAL ANESTHESIA TIME - EACH INCREMENTAL 1 MINUTE: Performed by: ANESTHESIOLOGY

## 2023-11-06 RX ORDER — PROPOFOL 10 MG/ML
INJECTION, EMULSION INTRAVENOUS CONTINUOUS PRN
Status: DISCONTINUED | OUTPATIENT
Start: 2023-11-06 | End: 2023-11-06

## 2023-11-06 RX ORDER — SODIUM CHLORIDE, SODIUM LACTATE, POTASSIUM CHLORIDE, CALCIUM CHLORIDE 600; 310; 30; 20 MG/100ML; MG/100ML; MG/100ML; MG/100ML
20 INJECTION, SOLUTION INTRAVENOUS CONTINUOUS
Status: CANCELLED | OUTPATIENT
Start: 2023-11-06

## 2023-11-06 RX ORDER — ONDANSETRON HYDROCHLORIDE 2 MG/ML
INJECTION, SOLUTION INTRAVENOUS AS NEEDED
Status: DISCONTINUED | OUTPATIENT
Start: 2023-11-06 | End: 2023-11-06

## 2023-11-06 RX ADMIN — SODIUM CHLORIDE, SODIUM LACTATE, POTASSIUM CHLORIDE, AND CALCIUM CHLORIDE: .6; .31; .03; .02 INJECTION, SOLUTION INTRAVENOUS at 07:49

## 2023-11-06 RX ADMIN — ONDANSETRON 4 MG: 2 INJECTION INTRAMUSCULAR; INTRAVENOUS at 08:01

## 2023-11-06 RX ADMIN — PROPOFOL 400 MCG/KG/MIN: 10 INJECTION, EMULSION INTRAVENOUS at 07:58

## 2023-11-06 SDOH — HEALTH STABILITY: MENTAL HEALTH: CURRENT SMOKER: 0

## 2023-11-06 ASSESSMENT — COLUMBIA-SUICIDE SEVERITY RATING SCALE - C-SSRS
2. HAVE YOU ACTUALLY HAD ANY THOUGHTS OF KILLING YOURSELF?: NO
6. HAVE YOU EVER DONE ANYTHING, STARTED TO DO ANYTHING, OR PREPARED TO DO ANYTHING TO END YOUR LIFE?: NO
1. IN THE PAST MONTH, HAVE YOU WISHED YOU WERE DEAD OR WISHED YOU COULD GO TO SLEEP AND NOT WAKE UP?: NO

## 2023-11-06 ASSESSMENT — PAIN SCALES - GENERAL
PAINLEVEL_OUTOF10: 0 - NO PAIN

## 2023-11-06 ASSESSMENT — PAIN - FUNCTIONAL ASSESSMENT
PAIN_FUNCTIONAL_ASSESSMENT: 0-10
PAIN_FUNCTIONAL_ASSESSMENT: 0-10

## 2023-11-06 NOTE — PRE-SEDATION DOCUMENTATION
59 yo woman with FH of advanced adenomas    Exam NAD  Chest clear  Cor RRR  Abd benign    For increased risk screening colonoscopy

## 2023-11-06 NOTE — ANESTHESIA POSTPROCEDURE EVALUATION
Patient: Gladys Norris    Procedure Summary       Date: 11/06/23 Room / Location: [facility] OR    Anesthesia Start: 0755 Anesthesia Stop: 0829    Procedure: COLONOSCOPY Diagnosis:       Colon cancer screening      Family history of adenomatous and serrated polyps    Scheduled Providers: Zaheer Funk MD; Teddy Varela MD Responsible Provider: Teddy Varela MD    Anesthesia Type: MAC ASA Status: 3            Anesthesia Type: MAC    Vitals Value Taken Time   /77 11/06/23 0855   Temp 36.6 °C (97.9 °F) 11/06/23 0855   Pulse 55 11/06/23 0855   Resp 16 11/06/23 0855   SpO2 98 % 11/06/23 0855       Anesthesia Post Evaluation    Patient location during evaluation: bedside  Patient participation: complete - patient participated  Level of consciousness: awake  Pain management: satisfactory to patient  Cardiovascular status: acceptable  Respiratory status: acceptable  Hydration status: acceptable  Comments: No nausea or vomiting     Did well        There were no known notable events for this encounter.

## 2023-11-06 NOTE — DISCHARGE INSTRUCTIONS
Patient Instructions after a Colonoscopy      The anesthetics, sedatives or narcotics which were given to you today will be acting in your body for the next 24 hours, so you might feel a little sleepy or groggy.  This feeling should slowly wear off. Carefully read and follow the instructions.     You received sedation today:  - Do not drive or operate any machinery or power tools of any kind.   - No alcoholic beverages today, not even beer or wine.  - Do not make any important decisions or sign any legal documents.  - No over the counter medications that contain alcohol or that may cause drowsiness.  - Do not make any important decisions or sign any legal documents.    While it is common to experience mild to moderate abdominal distention, gas, or belching after your procedure, if any of these symptoms occur following discharge from the GI Lab or within one week of having your procedure, call the [facility] to be advised whether a visit to your nearest Urgent Care or Emergency Department is indicated.  Take this paper with you if you go.     - If you develop an allergic reaction to the medications that were given during your procedure such as difficulty breathing, rash, hives, severe nausea, vomiting or lightheadedness.  - If you experience chest pain, shortness of breath, severe abdominal pain, fevers and chills.  -If you develop signs and symptoms of bleeding such as blood in your spit, if your stools turn black, tarry, or bloody  - If you have not urinated within 8 hours following your procedure.  - If your IV site becomes painful, red, inflamed, or looks infected.    If you received a biopsy/polypectomy/sphincterotomy the following instructions apply below:    __ Do not use Aspirin containing products, non-steroidal medications or anti-coagulants for one week following your procedure. (Examples of these types of medications are: Advil, Arthrotec, Aleve, Coumadin, Ecotrin, Heparin, Ibuprofen,  Indocin, Motrin, Naprosyn, Nuprin, Plavix, Vioxx, and Voltarin, or their generic forms.  This list is not all-inclusive.  Check with your physician or pharmacist before resuming medications.)   __ Eat a soft diet today.  Avoid foods that are poorly digested for the next 24 hours.  These foods would include: nuts, beans, lettuce, red meats, and fried foods. Start with liquids and advance your diet as tolerated, gradually work up to eating solids.   __ Do not have a Barium Study or Enema for one week.    Your physician recommends the additional following instructions:    -You have a contact number available for emergencies. The signs and symptoms of potential delayed complications were discussed with you. You may return to normal activities tomorrow.  -Resume your previous diet.  -Continue your present medications.   -We are waiting for your pathology results.  -Your physician has recommended a repeat colonoscopy (date to be determined after pending pathology results are reviewed) for surveillance based on pathology results.  -The findings and recommendations have been discussed with you.  -The findings and recommendations were discussed with your family.  - Please see Medication Reconciliation Form for new medication/medications prescribed.       If you experience any problems or have any questions following discharge from the GI Lab, please call:    Zaheer Fnuk MD  [phone]      Nurse Signature                                                                        Date___________________                                                                            Patient/Responsible Party Signature                                        Date___________________

## 2023-11-06 NOTE — ANESTHESIA PREPROCEDURE EVALUATION
Patient: Gladys Norris    Procedure Information       Date/Time: 11/06/23 0800    Scheduled providers: Zaheer Funk MD    Procedure: COLONOSCOPY    Location: [facility] OR            Relevant Problems   Cardiovascular   (+) Benign essential hypertension   (+) Hyperlipidemia      Endocrine   (+) Hypothyroidism      Neuro/Psych   (+) Ulnar nerve impingement      Musculoskeletal   (+) SLE (systemic lupus erythematosus) (CMS/HCC)      Eyes, Ears, Nose, and Throat   (+) Conductive hearing loss in right ear   (+) Mixed conductive and sensorineural hearing loss of right ear with restricted hearing of left ear       Clinical information reviewed:   Tobacco  Allergies  Meds  Problems  Med Hx  Surg Hx   Fam Hx          NPO Detail:  No data recorded     Physical Exam    Airway  Mallampati: I  TM distance: >3 FB  Neck ROM: full     Cardiovascular   Rhythm: regular  Rate: normal     Dental    Pulmonary    Abdominal            Anesthesia Plan    ASA 3     MAC   (MAC anesthesia explained, Pt. Agrees, Ok to proceed)  The patient is not a current smoker.  Patient was not previously instructed to abstain from smoking on day of procedure.  Patient did not smoke on day of procedure.      Plan discussed with [facility] and attending.

## 2023-11-07 ASSESSMENT — PAIN SCALES - GENERAL: PAINLEVEL_OUTOF10: 0 - NO PAIN

## 2023-11-07 NOTE — ADDENDUM NOTE
Encounter addended by: Dianne Coates RN on: 11/7/2023 10:31 AM   Actions taken: Contacts section saved, Flowsheet accepted

## 2023-11-10 ENCOUNTER — LAB (OUTPATIENT)
Dept: LAB | Facility: LAB | Age: 58
End: 2023-11-10
Payer: COMMERCIAL

## 2023-11-10 DIAGNOSIS — E03.9 HYPOTHYROIDISM, UNSPECIFIED TYPE: ICD-10-CM

## 2023-11-10 LAB — TSH SERPL-ACNC: 2.1 MIU/L (ref 0.44–3.98)

## 2023-11-10 PROCEDURE — 84443 ASSAY THYROID STIM HORMONE: CPT

## 2023-11-10 PROCEDURE — 36415 COLL VENOUS BLD VENIPUNCTURE: CPT

## 2023-11-20 DIAGNOSIS — E03.9 HYPOTHYROIDISM, UNSPECIFIED TYPE: ICD-10-CM

## 2023-11-20 RX ORDER — LEVOTHYROXINE SODIUM 175 UG/1
175 TABLET ORAL DAILY
Qty: 90 TABLET | Refills: 3 | Status: SHIPPED | OUTPATIENT
Start: 2023-11-20 | End: 2023-12-11

## 2023-11-27 ENCOUNTER — LAB REQUISITION (OUTPATIENT)
Dept: LAB | Facility: HOSPITAL | Age: 58
End: 2023-11-27
Payer: COMMERCIAL

## 2023-11-27 LAB — SARS-COV-2 RNA RESP QL NAA+PROBE: DETECTED

## 2023-11-27 PROCEDURE — 87635 SARS-COV-2 COVID-19 AMP PRB: CPT

## 2023-12-07 ENCOUNTER — TELEMEDICINE (OUTPATIENT)
Dept: RHEUMATOLOGY | Facility: CLINIC | Age: 58
End: 2023-12-07
Payer: COMMERCIAL

## 2023-12-07 VITALS
DIASTOLIC BLOOD PRESSURE: 72 MMHG | HEART RATE: 62 BPM | BODY MASS INDEX: 28.7 KG/M2 | WEIGHT: 200 LBS | SYSTOLIC BLOOD PRESSURE: 118 MMHG

## 2023-12-07 DIAGNOSIS — M32.9 SYSTEMIC LUPUS ERYTHEMATOSUS, UNSPECIFIED SLE TYPE, UNSPECIFIED ORGAN INVOLVEMENT STATUS (MULTI): Primary | ICD-10-CM

## 2023-12-07 PROBLEM — G56.01 RIGHT CARPAL TUNNEL SYNDROME: Status: ACTIVE | Noted: 2023-12-07

## 2023-12-07 PROBLEM — G56.21 CUBITAL TUNNEL SYNDROME, RIGHT: Status: ACTIVE | Noted: 2023-12-07

## 2023-12-07 PROCEDURE — 99214 OFFICE O/P EST MOD 30 MIN: CPT | Performed by: INTERNAL MEDICINE

## 2023-12-07 RX ORDER — METHYLPREDNISOLONE 4 MG/1
TABLET ORAL
COMMUNITY
Start: 2023-11-09 | End: 2024-03-13 | Stop reason: ALTCHOICE

## 2023-12-07 RX ORDER — PHENTERMINE HYDROCHLORIDE 37.5 MG/1
1 TABLET ORAL DAILY
COMMUNITY
Start: 2021-05-11 | End: 2024-03-13 | Stop reason: SDUPTHER

## 2023-12-07 ASSESSMENT — ENCOUNTER SYMPTOMS
DEPRESSION: 0
OCCASIONAL FEELINGS OF UNSTEADINESS: 0
LOSS OF SENSATION IN FEET: 0

## 2023-12-07 NOTE — PROGRESS NOTES
Subjective   Patient ID: Gladys Norris is a 58 y.o. female who presents for Follow-up (Patient tested positive for covid.).    HPI 58 year-old female here for follow-up regarding SLE.   Virtual visit is being done today because of recent COVID infection.  Verbal consent was given for visit.  Patient is at home at the time of the visit.     Joint pain started in about 2018.  Joint pain has worsened over the last 4 to 5 years.  Joint pain seems worse as the day progresses.  She is stiff for 20 to 45 minutes in the morning.  She currently complains of a lot of knee pain, especially when she goes from a sitting to standing position. Pain is especially severe in her left knee.  She complains of left lateral hip pain, but she notes it does not hurt to lay on her hip at nighttime. She has increased pain when she is standing.     X-ray of her left hip done August 1, 2023 shows mild OA.     She gets intermittent sciatica.  She was off work for 1 week July 2022 due to sciatica.  She took a Medrol Dosepak at that point with some relief.     She also uses Motrin 800 mg once or twice weekly.     She started hydroxychloroquine 200 mg twice daily September 2022 for probable SLE.  HCQ does help with pain.     She is Dr. Alvarez regarding tingling and lightening bolts in the left fourth and fifth fingers.  EMG done March 2023 showed mild to moderate carpal tunnel syndrome and ulnar neuropathy.  Dr. Alvarez is not recommending surgery at present, he told her she will know when she is ready.  She is currently going to sleep with a carpal tunnel splint.     She does have mild dry eyes, thinks her distance vision is not quite as good.  She uses over-the-counter artificial tears frequently. She uses refresh as well as a gel at bedtime.  She had a follow-up eye exam with Dr. Solis 2/23.  Follow-up eye exam is scheduled for August 2023.    She did have a probable flare of SLE November 17, 2023.  She developed a malar rash and  widespread joint pain.  Symptoms resolved with taking a Medrol Dosepak.    She also was diagnosed with COVID 2023.  She had a runny nose for 2 weeks prior to developing a sore throat, generalized achiness, congestion and headache.  She was not treated with an antiviral.  Most of her symptoms have resolved.  She is still mildly fatigued and develops occasional headache.     She sometimes gets a malar rash.   She complains of mild dry eyes.  1    Labs 2022: BENITA 1:1 60, double-stranded DNA 12 (positive greater than or equal to 10), SSA and SSB antibodies negative, anti-Coleman and RNP antibodies negative, rheumatoid factor negative, Citrulline antibody negative, CRP less than 0.1, CBC normal, CMP normal except glucose 102, TSH 0.45, cholesterol 151, HDL 68, LDL 70, triglycerides 67.  Labs 2022: C3 and C4 normal, anticardiolipin antibody negative, beta-2 glycoprotein antibody negative, urinalysis normal  Labs 2022: CBC normal except hemoglobin 11.6, BMP normal, double-stranded DNA 12 (+10 or greater), C3 and C4 normal, spot urine protein to creatinine ratio 0.09, lupus anticoagulant negative  Labs : CBC normal, BMP normal except creatinine 1.06 (GFR 60) and glucose 129, dsDNA 9 (equivocal), TSH 3.2, spot urine protein to creatinine ratio 0.09, C3 and C4 normal, cholesterol 184, LDL 88, HDL 79.5, triglycerides 82  Labs 2023: BMP normal, CBC normal, ESR 2, TSH 1.76, hemoglobin A1c 5.7, spot urine protein creatinine ratio 0.1  1    Medical problem list:   -Hypertension   -Hyperlipidemia   -Hypothyroidism   - SLE    Allergies: Reviewed as documented     Surgeries:    Cholecystectomy   Stapedectomy     Social history: .   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   Denies use of tobacco and alcohol..     Family history: Sister has SLE             ROS:  General: Denies fevers or chills. Has some fatigue.  CV: Denies chest pain or palpitations.  Denies leg  edema.  Lungs: Denies coughing or shortness of breath.  Skin: Denies rashes or nodules.  MS: Denies joint pain or joint swelling.     Objective   /72   Pulse 62   Wt 90.7 kg (200 lb)   BMI 28.70 kg/m²     Physical Exam  HEENT: External exam of the eyes and ears is normal.  CV: No apparent edema.  Lungs: Normal respiratory effort.  Neuro: Affect appropriate.  Skin: No malar rash.    Assessment/Plan   Problem List Items Addressed This Visit             ICD-10-CM    SLE (systemic lupus erythematosus) (CMS/Former[facility]) - Primary M32.9         1. SLE- has positive BENITA, positive double-stranded DNA, arthralgias.  She does have mild erythema in the malar eminence, which does not extend into the nasolabial fold. She has telangiectasias on the bridge of her nose, which would be consistent with rosacea. She does note that the rash worsens with sun exposure, which is suggestive of a malar rash with SLE.     She started hydroxychloroquine September 2022, and is having significantly less joint pain.  Follow-up eye exam was done 3/23 with Dr. Solis.    She did have a brief flare November 17, 2023 when she developed a malar rash and generalized arthralgias.  This occurred shortly after doing a colonoscopy prep.  Symptoms resolved with a Medrol Dosepak.     2. BMI 29- stable. She will continue to work on weight loss.     3. Anterior left knee pain-likely patellofemoral OA. Recommend try PT recommend Voltaren gel 4 g 4 times daily as needed..    4.  COVID infection-tested + November 17, 2023.  She still has mild fatigue and occasional headache but is feeling much better and has returned to work.    Plan:  Check CBC with differential, BMP, double-stranded DNA, C3, C4, ESR, spot urine protein to creatinine ratio.  Follow-up in 3 months.

## 2023-12-10 DIAGNOSIS — E03.9 HYPOTHYROIDISM, UNSPECIFIED TYPE: ICD-10-CM

## 2023-12-11 RX ORDER — LEVOTHYROXINE SODIUM 175 UG/1
175 TABLET ORAL
Qty: 90 TABLET | Refills: 0 | Status: SHIPPED | OUTPATIENT
Start: 2023-12-11 | End: 2024-04-04 | Stop reason: SDUPTHER

## 2024-01-02 ENCOUNTER — LAB (OUTPATIENT)
Dept: LAB | Facility: LAB | Age: 59
End: 2024-01-02
Payer: COMMERCIAL

## 2024-01-02 DIAGNOSIS — M32.9 SYSTEMIC LUPUS ERYTHEMATOSUS, UNSPECIFIED SLE TYPE, UNSPECIFIED ORGAN INVOLVEMENT STATUS (MULTI): ICD-10-CM

## 2024-01-02 LAB
ANION GAP SERPL CALC-SCNC: 11 MMOL/L (ref 10–20)
BASOPHILS # BLD AUTO: 0.04 X10*3/UL (ref 0–0.1)
BASOPHILS NFR BLD AUTO: 0.8 %
BUN SERPL-MCNC: 13 MG/DL (ref 6–23)
C3 SERPL-MCNC: 147 MG/DL (ref 87–200)
C4 SERPL-MCNC: 29 MG/DL (ref 10–50)
CALCIUM SERPL-MCNC: 9.4 MG/DL (ref 8.6–10.3)
CHLORIDE SERPL-SCNC: 107 MMOL/L (ref 98–107)
CO2 SERPL-SCNC: 30 MMOL/L (ref 21–32)
CREAT SERPL-MCNC: 0.99 MG/DL (ref 0.5–1.05)
CREAT UR-MCNC: 135.9 MG/DL (ref 20–320)
DSDNA AB SER-ACNC: 9 IU/ML
EOSINOPHIL # BLD AUTO: 0.2 X10*3/UL (ref 0–0.7)
EOSINOPHIL NFR BLD AUTO: 3.9 %
ERYTHROCYTE [DISTWIDTH] IN BLOOD BY AUTOMATED COUNT: 12.7 % (ref 11.5–14.5)
ERYTHROCYTE [SEDIMENTATION RATE] IN BLOOD BY WESTERGREN METHOD: 13 MM/H (ref 0–30)
GFR SERPL CREATININE-BSD FRML MDRD: 66 ML/MIN/1.73M*2
GLUCOSE SERPL-MCNC: 103 MG/DL (ref 74–99)
HCT VFR BLD AUTO: 42.5 % (ref 36–46)
HGB BLD-MCNC: 14 G/DL (ref 12–16)
IMM GRANULOCYTES # BLD AUTO: 0.01 X10*3/UL (ref 0–0.7)
IMM GRANULOCYTES NFR BLD AUTO: 0.2 % (ref 0–0.9)
LYMPHOCYTES # BLD AUTO: 1.61 X10*3/UL (ref 1.2–4.8)
LYMPHOCYTES NFR BLD AUTO: 31.4 %
MCH RBC QN AUTO: 31.2 PG (ref 26–34)
MCHC RBC AUTO-ENTMCNC: 32.9 G/DL (ref 32–36)
MCV RBC AUTO: 95 FL (ref 80–100)
MONOCYTES # BLD AUTO: 0.42 X10*3/UL (ref 0.1–1)
MONOCYTES NFR BLD AUTO: 8.2 %
NEUTROPHILS # BLD AUTO: 2.84 X10*3/UL (ref 1.2–7.7)
NEUTROPHILS NFR BLD AUTO: 55.5 %
NRBC BLD-RTO: 0 /100 WBCS (ref 0–0)
PLATELET # BLD AUTO: 311 X10*3/UL (ref 150–450)
POTASSIUM SERPL-SCNC: 4.8 MMOL/L (ref 3.5–5.3)
PROT UR-ACNC: 11 MG/DL (ref 5–24)
PROT/CREAT UR: 0.08 MG/MG CREAT (ref 0–0.17)
RBC # BLD AUTO: 4.49 X10*6/UL (ref 4–5.2)
SODIUM SERPL-SCNC: 143 MMOL/L (ref 136–145)
WBC # BLD AUTO: 5.1 X10*3/UL (ref 4.4–11.3)

## 2024-01-02 PROCEDURE — 86160 COMPLEMENT ANTIGEN: CPT

## 2024-01-02 PROCEDURE — 85025 COMPLETE CBC W/AUTO DIFF WBC: CPT

## 2024-01-02 PROCEDURE — 84156 ASSAY OF PROTEIN URINE: CPT

## 2024-01-02 PROCEDURE — 82570 ASSAY OF URINE CREATININE: CPT

## 2024-01-02 PROCEDURE — 86225 DNA ANTIBODY NATIVE: CPT

## 2024-01-02 PROCEDURE — 85652 RBC SED RATE AUTOMATED: CPT

## 2024-01-02 PROCEDURE — 36415 COLL VENOUS BLD VENIPUNCTURE: CPT

## 2024-01-02 PROCEDURE — 80048 BASIC METABOLIC PNL TOTAL CA: CPT

## 2024-01-03 ENCOUNTER — HOSPITAL ENCOUNTER (OUTPATIENT)
Dept: RADIOLOGY | Facility: HOSPITAL | Age: 59
Discharge: HOME | End: 2024-01-03
Payer: COMMERCIAL

## 2024-01-03 DIAGNOSIS — Z12.31 SCREENING MAMMOGRAM FOR BREAST CANCER: ICD-10-CM

## 2024-01-03 PROCEDURE — 77067 SCR MAMMO BI INCL CAD: CPT | Performed by: RADIOLOGY

## 2024-01-03 PROCEDURE — 77067 SCR MAMMO BI INCL CAD: CPT

## 2024-01-03 PROCEDURE — 77063 BREAST TOMOSYNTHESIS BI: CPT | Performed by: RADIOLOGY

## 2024-01-12 DIAGNOSIS — I10 BENIGN ESSENTIAL HYPERTENSION: Primary | ICD-10-CM

## 2024-01-12 RX ORDER — LOSARTAN POTASSIUM 100 MG/1
TABLET ORAL DAILY
Qty: 90 TABLET | Refills: 3 | Status: SHIPPED | OUTPATIENT
Start: 2024-01-12

## 2024-03-13 ENCOUNTER — OFFICE VISIT (OUTPATIENT)
Dept: PRIMARY CARE | Facility: CLINIC | Age: 59
End: 2024-03-13
Payer: COMMERCIAL

## 2024-03-13 VITALS
SYSTOLIC BLOOD PRESSURE: 138 MMHG | TEMPERATURE: 97.6 F | WEIGHT: 207 LBS | HEART RATE: 62 BPM | OXYGEN SATURATION: 99 % | BODY MASS INDEX: 29.7 KG/M2 | DIASTOLIC BLOOD PRESSURE: 70 MMHG | RESPIRATION RATE: 16 BRPM

## 2024-03-13 DIAGNOSIS — M77.9 TENDONITIS: Primary | ICD-10-CM

## 2024-03-13 DIAGNOSIS — M32.9 SYSTEMIC LUPUS ERYTHEMATOSUS, UNSPECIFIED SLE TYPE, UNSPECIFIED ORGAN INVOLVEMENT STATUS (MULTI): ICD-10-CM

## 2024-03-13 PROCEDURE — 3075F SYST BP GE 130 - 139MM HG: CPT | Performed by: INTERNAL MEDICINE

## 2024-03-13 PROCEDURE — 99214 OFFICE O/P EST MOD 30 MIN: CPT | Performed by: INTERNAL MEDICINE

## 2024-03-13 PROCEDURE — 3078F DIAST BP <80 MM HG: CPT | Performed by: INTERNAL MEDICINE

## 2024-03-13 PROCEDURE — 3008F BODY MASS INDEX DOCD: CPT | Performed by: INTERNAL MEDICINE

## 2024-03-13 PROCEDURE — 1036F TOBACCO NON-USER: CPT | Performed by: INTERNAL MEDICINE

## 2024-03-13 RX ORDER — FAMOTIDINE 40 MG/1
40 TABLET, FILM COATED ORAL DAILY
Qty: 30 TABLET | Refills: 1 | Status: SHIPPED | OUTPATIENT
Start: 2024-03-13 | End: 2024-04-05 | Stop reason: SDUPTHER

## 2024-03-13 RX ORDER — PREDNISONE 10 MG/1
TABLET ORAL
Qty: 30 TABLET | Refills: 0 | Status: SHIPPED | OUTPATIENT
Start: 2024-03-13 | End: 2024-03-25 | Stop reason: SDUPTHER

## 2024-03-13 RX ORDER — PHENTERMINE HYDROCHLORIDE 37.5 MG/1
37.5 TABLET ORAL DAILY
Qty: 30 TABLET | Refills: 0 | Status: SHIPPED | OUTPATIENT
Start: 2024-03-13 | End: 2024-04-04 | Stop reason: ALTCHOICE

## 2024-03-13 ASSESSMENT — ENCOUNTER SYMPTOMS
DIZZINESS: 1
NAUSEA: 1

## 2024-03-13 NOTE — PROGRESS NOTES
Patient here for tendonitis both arms     Subjective   Patient ID: Gladys Norris is a 58 y.o. female who presents for Tendonitis.    The patient reports bilateral wrist pain, worse on the left side, since 2/2024.  Symptoms extend distally to the fingers and proximally to the left shoulder and seem to be worst when trying to hold her phone.  She recently was knitting a quilt for a family member, and suspects that this may have precipitated the symptoms.  The patient has carpal tunnel syndrome as well, and notes that the character of this pain is not similar.  She has not yet tried the cyclobenzaprine she has at home, and has responded well to oral corticosteroids in the past.    The patient mentions occasional dizziness and nausea with rolling over in bed following a history of previous ear surgery.     The patient requests a refill for phentermine for weight loss management, as she has tolerated this medication in the past and responded well.       Review of Systems   Gastrointestinal:  Positive for nausea.   Musculoskeletal:         Positive for bilateral upper extremity pain.   Neurological:  Positive for dizziness.       Objective   Physical Exam  Constitutional:       Appearance: Normal appearance.   Neck:      Vascular: No carotid bruit.   Cardiovascular:      Rate and Rhythm: Normal rate and regular rhythm.      Heart sounds: Normal heart sounds.   Pulmonary:      Effort: Pulmonary effort is normal.      Breath sounds: Normal breath sounds.   Abdominal:      General: Bowel sounds are normal.      Palpations: Abdomen is soft.      Tenderness: There is no abdominal tenderness.   Skin:     General: Skin is warm and dry.   Neurological:      General: No focal deficit present.      Mental Status: She is alert and oriented to person, place, and time. Mental status is at baseline.   Psychiatric:         Mood and Affect: Mood normal.         Behavior: Behavior normal.       Assessment/Plan   Problem List Items  Addressed This Visit             ICD-10-CM    SLE (systemic lupus erythematosus) (CMS/formerly [facility]) M32.9     Other Visit Diagnoses         Codes    Tendonitis    -  Primary M77.9    Relevant Medications    predniSONE (Deltasone) 10 mg tablet    famotidine (Pepcid) 40 mg tablet    BMI 29.0-29.9,adult     Z68.29    Relevant Medications    phentermine (Adipex-P) 37.5 mg tablet            IMPRESSION/PLAN :      Tendonitis  - Prescribed prednisone 10mg taper, and famotidine 40mg once nightly for additional GI protection.  Patient is okay to try cyclobenzaprine 10mg at bedtime prn she already has at home.  Call the clinic if symptoms persist or worsen.     BMI 29.0-29.9  - Prescribed phentermine 37.5mg once daily, discussed adverse effect profile and therapeutic expectations as well as treatment regulations.  Instructed patient to start only after prednisone 10 mg taper above is completed.  Patient verbalized understanding and agreement. Call the clinic if symptoms persist or worsen.     HTN   - /70 in office today, continue on Losartan 100mg 1/2 tab daily.      HLD   - Stable, continue on atorvastatin 10mg QD.       Hypothyroid   - Last TSH in normal range at 3.45 - 5/2023.  Increased by nearly 3.0 points since last reading.  Increase levothyroxine from 150mcg to 175 mcg every day.  Previously ordered TSH, pending.     Hyperglycemia  - Last  mg/dL per 5/18/2023.  Previously ordered repeat BMP to be completed in the fasting state and HbA1c.     Carpal Tunnel Syndrome   - I have advised the patient to try to wear a carpal tunnel wrist splint to see if this helps with her symptom.      SLE   - Labs showed a weakly positive BENITA but Rheumatoid Factor, CRP, and citrulline antibody were unremarkable and ESR was cancelled by the lab on 7/2022. Started on hydroxychloroquine 200 mg BID on 10/3/2022, and experienced nausea w/o vomiting 1 week later. Follows up with  in Rheumatology.     Hematochezia   - Likely due to  hemorrhoids and constipation. Monitor for now, and advised the patient to call the clinic if symptoms return. Patient is due for repeat colonoscopy in 2023.     Left Hip Pain  - Xray Hip 8/2023 showed mild left hip OA.  Previously ordered referral to Physical Therapy.       Bilateral Knee Pain/Weakness  - Xray Knees 8/2023 showed no acute fracture or dislocation.  Previously ordered referral to physical thearpy.    Health Maintenance   - Routine labs 5/2023. Last Pap wnl 7/2022, repeat due 7/2023. Last Mammogram 1/2023. Last colonoscopy performed 12/2018, previously ordered repeat for 2023.     Follow up in 6 months, call sooner if needed.        Scribe Attestation  By signing my name below, I, Ibeth Carrera, Conner   attest that this documentation has been prepared under the direction and in the presence of Zaheer Buchanan DO.   Ibeth Carrera 03/13/24 11:35 AM

## 2024-03-22 ENCOUNTER — PATIENT MESSAGE (OUTPATIENT)
Dept: PRIMARY CARE | Facility: CLINIC | Age: 59
End: 2024-03-22
Payer: COMMERCIAL

## 2024-03-25 DIAGNOSIS — M77.9 TENDONITIS: ICD-10-CM

## 2024-03-25 RX ORDER — PREDNISONE 10 MG/1
TABLET ORAL
Qty: 30 TABLET | Refills: 0 | Status: SHIPPED | OUTPATIENT
Start: 2024-03-25 | End: 2024-04-03

## 2024-04-04 DIAGNOSIS — M77.9 TENDONITIS: Primary | ICD-10-CM

## 2024-04-04 DIAGNOSIS — E03.9 HYPOTHYROIDISM, UNSPECIFIED TYPE: ICD-10-CM

## 2024-04-04 RX ORDER — LEVOTHYROXINE SODIUM 175 UG/1
175 TABLET ORAL
Qty: 90 TABLET | Refills: 3 | Status: SHIPPED | OUTPATIENT
Start: 2024-04-04

## 2024-04-04 RX ORDER — CYCLOBENZAPRINE HCL 10 MG
10 TABLET ORAL NIGHTLY PRN
Qty: 30 TABLET | Refills: 1 | Status: SHIPPED | OUTPATIENT
Start: 2024-04-04

## 2024-04-05 DIAGNOSIS — M77.9 TENDONITIS: ICD-10-CM

## 2024-04-05 RX ORDER — FAMOTIDINE 40 MG/1
40 TABLET, FILM COATED ORAL DAILY
Qty: 30 TABLET | Refills: 1 | Status: SHIPPED | OUTPATIENT
Start: 2024-04-05 | End: 2024-04-10 | Stop reason: SDUPTHER

## 2024-04-10 DIAGNOSIS — M77.9 TENDONITIS: ICD-10-CM

## 2024-04-10 RX ORDER — FAMOTIDINE 40 MG/1
40 TABLET, FILM COATED ORAL DAILY
Qty: 30 TABLET | Refills: 1 | Status: SHIPPED | OUTPATIENT
Start: 2024-04-10 | End: 2024-10-07

## 2024-04-11 DIAGNOSIS — M32.9 SYSTEMIC LUPUS ERYTHEMATOSUS, UNSPECIFIED SLE TYPE, UNSPECIFIED ORGAN INVOLVEMENT STATUS (MULTI): ICD-10-CM

## 2024-04-11 RX ORDER — HYDROXYCHLOROQUINE SULFATE 200 MG/1
TABLET, FILM COATED ORAL 2 TIMES DAILY
Qty: 180 TABLET | Refills: 0 | Status: SHIPPED | OUTPATIENT
Start: 2024-04-11

## 2024-04-18 ENCOUNTER — OFFICE VISIT (OUTPATIENT)
Dept: PRIMARY CARE | Facility: CLINIC | Age: 59
End: 2024-04-18
Payer: COMMERCIAL

## 2024-04-18 VITALS
HEART RATE: 67 BPM | OXYGEN SATURATION: 97 % | BODY MASS INDEX: 30.84 KG/M2 | HEIGHT: 69 IN | SYSTOLIC BLOOD PRESSURE: 124 MMHG | DIASTOLIC BLOOD PRESSURE: 78 MMHG | TEMPERATURE: 97.3 F | WEIGHT: 208.2 LBS

## 2024-04-18 DIAGNOSIS — I10 BENIGN ESSENTIAL HYPERTENSION: ICD-10-CM

## 2024-04-18 DIAGNOSIS — E78.2 MIXED HYPERLIPIDEMIA: ICD-10-CM

## 2024-04-18 DIAGNOSIS — M32.9 SYSTEMIC LUPUS ERYTHEMATOSUS, UNSPECIFIED SLE TYPE, UNSPECIFIED ORGAN INVOLVEMENT STATUS (MULTI): ICD-10-CM

## 2024-04-18 DIAGNOSIS — R73.03 PREDIABETES: Primary | ICD-10-CM

## 2024-04-18 PROCEDURE — 3078F DIAST BP <80 MM HG: CPT | Performed by: INTERNAL MEDICINE

## 2024-04-18 PROCEDURE — 1036F TOBACCO NON-USER: CPT | Performed by: INTERNAL MEDICINE

## 2024-04-18 PROCEDURE — 3008F BODY MASS INDEX DOCD: CPT | Performed by: INTERNAL MEDICINE

## 2024-04-18 PROCEDURE — 3074F SYST BP LT 130 MM HG: CPT | Performed by: INTERNAL MEDICINE

## 2024-04-18 PROCEDURE — 99214 OFFICE O/P EST MOD 30 MIN: CPT | Performed by: INTERNAL MEDICINE

## 2024-04-18 ASSESSMENT — ENCOUNTER SYMPTOMS
DEPRESSION: 0
OCCASIONAL FEELINGS OF UNSTEADINESS: 0
LOSS OF SENSATION IN FEET: 0

## 2024-04-18 ASSESSMENT — PATIENT HEALTH QUESTIONNAIRE - PHQ9
1. LITTLE INTEREST OR PLEASURE IN DOING THINGS: NOT AT ALL
2. FEELING DOWN, DEPRESSED OR HOPELESS: NOT AT ALL
SUM OF ALL RESPONSES TO PHQ9 QUESTIONS 1 AND 2: 0

## 2024-04-18 NOTE — PROGRESS NOTES
Subjective   Patient ID: Gladys Norris is a 59 y.o. female who presents for Follow-up (Patient is being seen for a follow up.).    The patient reports that she stopped phentermine 37.5mg once daily after a few days due to dry mouth and palpitations.  She inquires about SGLP-1 agonists, and denies any known family history of pancreatic or thyroid caner.      The patient has an upcoming appointment with  from Orthopedic Surgery to address tendonitis.  She completed two rounds of oral corticosteroid tapers, and found this helped significantly.  The patient suspects she may have carpal tunnel syndrome.     The patient continues to follow with  from Rheumatology for a history of SLE, and is scheduled for an upcoming appointment.    The patient undergoes regular eye exams.    The patient's sister has a history of PCI for coronary artery disease.      Review of Systems   Musculoskeletal:         Positive for upper extremity pain.   All other systems reviewed and are negative.      Objective   Physical Exam  Constitutional:       Appearance: Normal appearance.   Neck:      Vascular: No carotid bruit.   Cardiovascular:      Rate and Rhythm: Normal rate and regular rhythm.      Heart sounds: Normal heart sounds.   Pulmonary:      Effort: Pulmonary effort is normal.      Breath sounds: Normal breath sounds.   Abdominal:      General: Bowel sounds are normal.      Palpations: Abdomen is soft.      Tenderness: There is no abdominal tenderness.   Skin:     General: Skin is warm and dry.   Neurological:      General: No focal deficit present.      Mental Status: She is alert and oriented to person, place, and time. Mental status is at baseline.   Psychiatric:         Mood and Affect: Mood normal.         Behavior: Behavior normal.       Assessment/Plan   Problem List Items Addressed This Visit             ICD-10-CM    Benign essential hypertension I10    Hyperlipidemia E78.5    Relevant Orders    CT cardiac  scoring wo IV contrast    SLE (systemic lupus erythematosus) (Multi) M32.9     Other Visit Diagnoses         Codes    Prediabetes    -  Primary R73.03    Relevant Medications    semaglutide 0.25 mg or 0.5 mg (2 mg/3 mL) pen injector (Start on 4/21/2024)            IMPRESSION/PLAN :      Hyperglycemia / Prediabetes  - Last HbA1c 5.7% per 8/2023,  mg/dL per 5/18/2023.  Prescribed semaglutide 0.25mg or 0.5mg (2mg/3ml) to be taken as 0.25 mg once weekly.  Discussed adverse effect profile and therapeutic expectations.       HLD   - Stable, continue on atorvastatin 10mg QD.  Ordered CT Cardiac Score to establish cardiac risk.    HTN   - /78 in office today, continue on Losartan 100mg 1/2 tab daily.      Hypothyroid   - Last TSH in normal range - 11/2023.  Increased by nearly 3.0 points since last reading.  Increase levothyroxine from 150mcg to 175 mcg every day.       Carpal Tunnel Syndrome   - I have advised the patient to try to wear a carpal tunnel wrist splint to see if this helps with her symptom.      SLE   - Labs showed a weakly positive BENITA but Rheumatoid Factor, CRP, and citrulline antibody were unremarkable and ESR was cancelled by the lab on 7/2022. Started on hydroxychloroquine 200 mg BID on 10/3/2022, and experienced nausea w/o vomiting 1 week later. Follows up with  in Rheumatology.    Tendonitis vs. Ulnar Neuropathy  - Has upcoming appointment with ortho hand     Health Maintenance   - Routine labs 5/2023. Last Pap wnl 7/2022. Last Mammogram 1/2023, ordered repeat for 2024. Last colonoscopy performed 11/2023, repeat due 11/2028.     Follow up in 6 months, call sooner if needed.        Scribe Attestation  By signing my name below, I, Ibeth Carrera, Conner   attest that this documentation has been prepared under the direction and in the presence of Zaheer Buchanan DO.   Ibeth Carrera 04/18/24 8:05 AM

## 2024-05-12 NOTE — PROGRESS NOTES
[facility]  Hand and Upper Extremity Service  Initial evaluation / Consultation         Consult requested by Referring Physician: Dr. Buchanan     Chief Complaint: Bilateral hand pain/numbness         59 y.o right hand dominant female with history of lupus who presents today for bilateral hand and thumb pain with numbness and tingling in her fingers. She also reports pain in her elbows and these symptoms have been ongoing for several months with episodic flares. She thought it was related to her lupus but her rheumatologist doesn't agree. A PCP treated her with 2 rounds of high dose oral steroids which did improve the symptoms but after the medications were discontinued, the symptoms returned. Most of her pain is over her thenar imminence bilaterally and at the base of her thumb. She denies any sleep disturbances but occasionally gets electric shooting sensations more so on the right than her left coming from the elbow into the hand. She had an EMG test with abnormal results over a year ago. She's tried bracing of the wrists with minimal improvement. She will occasionally use topical anti-inflammatory medications around the thumb which she states helps a little.        Please refer to New Patient Intake Form scanned into patient's electronic record for self reported past medical history, past surgical history, medications, allergies, family history, social history and 10 point review of systems    Examination:  Constitutional: Oriented to person, place, and time.  Appears well-developed and well-nourished.  Head: Normocephalic and atraumatic.  Eyes: Pupils are equal, round, and reactive to light.  Cardiovascular: Intact distal pulses.  Pulmonary/Chest/Breast: Effort normal. No respiratory distress.  Neurological: Alert and oriented to person, place, and time.  Skin: Skin is warm and dry.  Psychiatric: normal mood and affect.  Behavior is normal.  Musculoskeletal: Bilateral hands  reveal normal appearance without significant swelling or deformities. No thenar or intrinsic atrophy. Full finger and thumb flexion without triggering. Positive thumb CMC grind test bilaterally. Positive Mireya medial nerve compression test and Tinel's test bilaterally. Positive elbow flexion compression test and positive Tinel's sign over the cubital tunnel bilaterally with subjective tingling in all fingers bilaterally.          Personal Interpretation of Diagnostic studies: Review of xrays of right hand taken 7/22/22 demonstrates mild CMC joint arthritis with subluxation with no other significant abnormalities. Review of EMG study of right upper extremity from 3/2023 reveals right carpal and cubital tunnel syndrome. Grade is mildly severe. Left side was not examined on this study.            Impression: Bilateral thumb CMC arthritis, bilateral carpal tunnel syndrome, and bilateral cubital tunnel syndrome.           Plan: We've discussed all these diagnoses and treatment options. She believes her thumb arthritis is the biggest source of her symptoms. We've provided her Comfort Cool splints. She was most appropriately fitted with a Medium Plus which we don't carry. I've shown her how to obtain these on her own. She also requested injections. She'll monitor her symptoms with her carpal tunnel and cubital tunnel syndrome as well.           In Office Procedures Performed: Bilateral thumb CMC joint injections     S Inj/Asp: bilateral thumb CMC on 5/13/2024 6:47 PM  Indications: pain  Details: 25 G needle, dorsal approach  Medications (Right): 20 mg triamcinolone acetonide 40 mg/mL; 0.5 mL lidocaine 10 mg/mL (1 %)  Medications (Left): 20 mg triamcinolone acetonide 40 mg/mL; 0.5 mL lidocaine 10 mg/mL (1 %)  Outcome: tolerated well, no immediate complications  Procedure, treatment alternatives, risks and benefits explained, specific risks discussed. Consent was given by the patient. Immediately prior to procedure a time  out was called to verify the correct patient, procedure, equipment, support staff and site/side marked as required. Patient was prepped and draped in the usual sterile fashion.               Medical Decision Making:            Medications Prescribed: None            Follow up: As needed              Fabio Cortez MD  [facility]  Department of Orthopaedic Surgery  Hand and Upper Extremity Reconstruction      Scribe Attestation  By signing my name below, I, Mayelin Leija , Scribtameka   attest that this documentation has been prepared under the direction and in the presence of Dr. Fabio Cortez.      Dictation performed with the use of voice recognition software.  Syntax and grammatical errors may exist.

## 2024-05-13 ENCOUNTER — OFFICE VISIT (OUTPATIENT)
Dept: ORTHOPEDIC SURGERY | Facility: CLINIC | Age: 59
End: 2024-05-13
Payer: COMMERCIAL

## 2024-05-13 VITALS — BODY MASS INDEX: 30.81 KG/M2 | WEIGHT: 208 LBS | HEIGHT: 69 IN

## 2024-05-13 DIAGNOSIS — M19.049 CMC ARTHRITIS: Primary | ICD-10-CM

## 2024-05-13 DIAGNOSIS — G56.23 ULNAR NEUROPATHY OF BOTH UPPER EXTREMITIES: ICD-10-CM

## 2024-05-13 DIAGNOSIS — G56.03 CARPAL TUNNEL SYNDROME, BILATERAL: ICD-10-CM

## 2024-05-13 PROCEDURE — 20600 DRAIN/INJ JOINT/BURSA W/O US: CPT | Performed by: ORTHOPAEDIC SURGERY

## 2024-05-13 PROCEDURE — 3008F BODY MASS INDEX DOCD: CPT | Performed by: ORTHOPAEDIC SURGERY

## 2024-05-13 PROCEDURE — 99214 OFFICE O/P EST MOD 30 MIN: CPT | Performed by: ORTHOPAEDIC SURGERY

## 2024-05-13 PROCEDURE — 1036F TOBACCO NON-USER: CPT | Performed by: ORTHOPAEDIC SURGERY

## 2024-05-13 RX ORDER — TRIAMCINOLONE ACETONIDE 40 MG/ML
20 INJECTION, SUSPENSION INTRA-ARTICULAR; INTRAMUSCULAR
Status: COMPLETED | OUTPATIENT
Start: 2024-05-13 | End: 2024-05-13

## 2024-05-13 RX ORDER — LIDOCAINE HYDROCHLORIDE 10 MG/ML
0.5 INJECTION INFILTRATION; PERINEURAL
Status: COMPLETED | OUTPATIENT
Start: 2024-05-13 | End: 2024-05-13

## 2024-05-13 RX ADMIN — TRIAMCINOLONE ACETONIDE 20 MG: 40 INJECTION, SUSPENSION INTRA-ARTICULAR; INTRAMUSCULAR at 18:47

## 2024-05-13 RX ADMIN — LIDOCAINE HYDROCHLORIDE 0.5 ML: 10 INJECTION INFILTRATION; PERINEURAL at 18:47

## 2024-05-13 ASSESSMENT — PAIN DESCRIPTION - DESCRIPTORS: DESCRIPTORS: ACHING;SORE;NUMBNESS

## 2024-05-13 ASSESSMENT — PAIN - FUNCTIONAL ASSESSMENT: PAIN_FUNCTIONAL_ASSESSMENT: 0-10

## 2024-05-13 NOTE — LETTER
May 13, 2024     Zaheer Buchanan DO  [address]  [facility], [address]  [facility] [address]    Patient: Gladys Norris   YOB: 1965   Date of Visit: 5/13/2024       Dear Dr. Zaheer Buchanan, :    Thank you for referring Gladys Norris to me for evaluation. Below are my notes for this consultation.  If you have questions, please do not hesitate to call me. I look forward to following your patient along with you.       Sincerely,     Fabio Cortez MD      CC: No Recipients  ______________________________________________________________________________________    [facility]  Hand and Upper Extremity Service  Initial evaluation / Consultation         Consult requested by Referring Physician: Dr. Buchanan     Chief Complaint: Bilateral hand pain/numbness         59 y.o right hand dominant female with history of lupus who presents today for bilateral hand and thumb pain with numbness and tingling in her fingers. She also reports pain in her elbows and these symptoms have been ongoing for several months with episodic flares. She thought it was related to her lupus but her rheumatologist doesn't agree. A PCP treated her with 2 rounds of high dose oral steroids which did improve the symptoms but after the medications were discontinued, the symptoms returned. Most of her pain is over her thenar imminence bilaterally and at the base of her thumb. She denies any sleep disturbances but occasionally gets electric shooting sensations more so on the right than her left coming from the elbow into the hand. She had an EMG test with abnormal results over a year ago. She's tried bracing of the wrists with minimal improvement. She will occasionally use topical anti-inflammatory medications around the thumb which she states helps a little.        Please refer to New Patient Intake Form scanned into patient's electronic record for self reported past medical history, past  surgical history, medications, allergies, family history, social history and 10 point review of systems    Examination:  Constitutional: Oriented to person, place, and time.  Appears well-developed and well-nourished.  Head: Normocephalic and atraumatic.  Eyes: Pupils are equal, round, and reactive to light.  Cardiovascular: Intact distal pulses.  Pulmonary/Chest/Breast: Effort normal. No respiratory distress.  Neurological: Alert and oriented to person, place, and time.  Skin: Skin is warm and dry.  Psychiatric: normal mood and affect.  Behavior is normal.  Musculoskeletal: Bilateral hands reveal normal appearance without significant swelling or deformities. No thenar or intrinsic atrophy. Full finger and thumb flexion without triggering. Positive thumb CMC grind test bilaterally. Positive Mireya medial nerve compression test and Tinel's test bilaterally. Positive elbow flexion compression test and positive Tinel's sign over the cubital tunnel bilaterally with subjective tingling in all fingers bilaterally.          Personal Interpretation of Diagnostic studies: Review of xrays of right hand taken 7/22/22 demonstrates mild CMC joint arthritis with subluxation with no other significant abnormalities. Review of EMG study of right upper extremity from 3/2023 reveals right carpal and cubital tunnel syndrome. Grade is mildly severe. Left side was not examined on this study.            Impression: Bilateral thumb CMC arthritis, bilateral carpal tunnel syndrome, and bilateral cubital tunnel syndrome.           Plan: We've discussed all these diagnoses and treatment options. She believes her thumb arthritis is the biggest source of her symptoms. We've provided her Comfort Cool splints. She was most appropriately fitted with a Medium Plus which we don't carry. I've shown her how to obtain these on her own. She also requested injections. She'll monitor her symptoms with her carpal tunnel and cubital tunnel syndrome as well.            In Office Procedures Performed: Bilateral thumb CMC joint injections     S Inj/Asp: bilateral thumb CMC on 5/13/2024 6:47 PM  Indications: pain  Details: 25 G needle, dorsal approach  Medications (Right): 20 mg triamcinolone acetonide 40 mg/mL; 0.5 mL lidocaine 10 mg/mL (1 %)  Medications (Left): 20 mg triamcinolone acetonide 40 mg/mL; 0.5 mL lidocaine 10 mg/mL (1 %)  Outcome: tolerated well, no immediate complications  Procedure, treatment alternatives, risks and benefits explained, specific risks discussed. Consent was given by the patient. Immediately prior to procedure a time out was called to verify the correct patient, procedure, equipment, support staff and site/side marked as required. Patient was prepped and draped in the usual sterile fashion.               Medical Decision Making:            Medications Prescribed: None            Follow up: As needed              Fabio Cortez MD  [facility]  Department of Orthopaedic Surgery  Hand and Upper Extremity Reconstruction      Scribe Attestation  By signing my name below, IMayelin , Scribtameka   attest that this documentation has been prepared under the direction and in the presence of Dr. Fabio Cortez.      Dictation performed with the use of voice recognition software.  Syntax and grammatical errors may exist.

## 2024-05-16 ENCOUNTER — APPOINTMENT (OUTPATIENT)
Dept: RADIOLOGY | Facility: CLINIC | Age: 59
End: 2024-05-16
Payer: COMMERCIAL

## 2024-06-03 ENCOUNTER — OFFICE VISIT (OUTPATIENT)
Dept: RHEUMATOLOGY | Facility: CLINIC | Age: 59
End: 2024-06-03
Payer: COMMERCIAL

## 2024-06-03 VITALS
HEIGHT: 69 IN | TEMPERATURE: 97.8 F | WEIGHT: 205.6 LBS | OXYGEN SATURATION: 98 % | SYSTOLIC BLOOD PRESSURE: 124 MMHG | HEART RATE: 52 BPM | BODY MASS INDEX: 30.45 KG/M2 | DIASTOLIC BLOOD PRESSURE: 76 MMHG

## 2024-06-03 DIAGNOSIS — M79.642 PAIN IN BOTH HANDS: Primary | ICD-10-CM

## 2024-06-03 DIAGNOSIS — M79.641 PAIN IN BOTH HANDS: Primary | ICD-10-CM

## 2024-06-03 DIAGNOSIS — M32.9 SYSTEMIC LUPUS ERYTHEMATOSUS, UNSPECIFIED SLE TYPE, UNSPECIFIED ORGAN INVOLVEMENT STATUS (MULTI): ICD-10-CM

## 2024-06-03 PROCEDURE — 1036F TOBACCO NON-USER: CPT | Performed by: INTERNAL MEDICINE

## 2024-06-03 PROCEDURE — 99214 OFFICE O/P EST MOD 30 MIN: CPT | Performed by: INTERNAL MEDICINE

## 2024-06-03 PROCEDURE — 3008F BODY MASS INDEX DOCD: CPT | Performed by: INTERNAL MEDICINE

## 2024-06-03 PROCEDURE — 3078F DIAST BP <80 MM HG: CPT | Performed by: INTERNAL MEDICINE

## 2024-06-03 PROCEDURE — 3074F SYST BP LT 130 MM HG: CPT | Performed by: INTERNAL MEDICINE

## 2024-06-03 ASSESSMENT — PATIENT HEALTH QUESTIONNAIRE - PHQ9
SUM OF ALL RESPONSES TO PHQ9 QUESTIONS 1 AND 2: 0
1. LITTLE INTEREST OR PLEASURE IN DOING THINGS: NOT AT ALL
2. FEELING DOWN, DEPRESSED OR HOPELESS: NOT AT ALL
1. LITTLE INTEREST OR PLEASURE IN DOING THINGS: NOT AT ALL
SUM OF ALL RESPONSES TO PHQ9 QUESTIONS 1 AND 2: 0
2. FEELING DOWN, DEPRESSED OR HOPELESS: NOT AT ALL

## 2024-06-03 ASSESSMENT — ENCOUNTER SYMPTOMS
DEPRESSION: 0
LOSS OF SENSATION IN FEET: 0
OCCASIONAL FEELINGS OF UNSTEADINESS: 0

## 2024-06-03 NOTE — PATIENT INSTRUCTIONS
Xray both hands.  Check labs: CBC with diff, BMP, ESR, dsDNA, C3, C4, spot urine protein to creatinine ratio.  Follow-up in 4 months.

## 2024-06-03 NOTE — PROGRESS NOTES
Subjective   Patient ID: Gladys Norris is a 59 y.o. female who presents for Follow-up.    HPI 59 year-old female here for follow-up regarding SLE. (BENITA 1:160, dsDNA 12, malar rash, arthralgias).     Joint pain started in about 2018.  Joint pain has worsened over the last 4 to 5 years.  Joint pain seems worse as the day progresses.  She is stiff for 20 to 45 minutes in the morning.  She currently complains of a lot of knee pain, especially when she goes from a sitting to standing position. Pain is especially severe in her left knee.     X-ray of her left hip done August 1, 2023 shows mild OA.     She gets intermittent sciatica.  She was off work for 1 week July 2022 due to sciatica.  She took a Medrol Dosepak at that point with some relief.     Her major current complaint is pain at base of both thumbs, right greater than left. Dr. Cortez injected 1st CMC joint bilaterally 5/13/24, which only helped for 2 days.  She is unable to do activities that does recreationally, such as quilting and cross stitch. She has her  open jars for her.  She is currently wearing braces at work.  She is concerned about being able to complete tasks at her job.  She will follow up with Dr. Cortez 6/20/24.  She is currently using Motrin 800 mg 2x daily, famotidine 40 mg daily, and tylenol 1000 mg daily.     She started hydroxychloroquine 200 mg twice daily September 2022 for probable SLE.  HCQ does help with pain.     EMG done March 2023 showed mild to moderate carpal tunnel syndrome and ulnar neuropathy.  Dr. Alvarez is not recommending surgery at present, he told her she will know when she is ready.  She is currently going to sleep with a carpal tunnel splint.     She does have mild dry eyes, thinks her distance vision is not quite as good.  She uses over-the-counter artificial tears frequently. She uses refresh as well as a gel at bedtime.  She had a follow-up eye exam with Dr. Solis 4/24.    She did have a probable flare of  SLE 2023.  She developed a malar rash and widespread joint pain.  Symptoms resolved with taking a Medrol Dosepak.    She sometimes still gets a malar rash.   She complains of mild dry eyes.  1    Labs 2022: BENITA 1:1 60, double-stranded DNA 12 (positive greater than or equal to 10), SSA and SSB antibodies negative, anti-Coleman and RNP antibodies negative, rheumatoid factor negative, Citrulline antibody negative, CRP less than 0.1, CBC normal, CMP normal except glucose 102, TSH 0.45, cholesterol 151, HDL 68, LDL 70, triglycerides 67.  Labs 2022: C3 and C4 normal, anticardiolipin antibody negative, beta-2 glycoprotein antibody negative, urinalysis normal  Labs 2022: CBC normal except hemoglobin 11.6, BMP normal, double-stranded DNA 12 (+10 or greater), C3 and C4 normal, spot urine protein to creatinine ratio 0.09, lupus anticoagulant negative  Labs : CBC normal, BMP normal except creatinine 1.06 (GFR 60) and glucose 129, dsDNA 9 (equivocal), TSH 3.2, spot urine protein to creatinine ratio 0.09, C3 and C4 normal, cholesterol 184, LDL 88, HDL 79.5, triglycerides 82  Labs 2023: BMP normal, CBC normal, ESR 2, TSH 1.76, hemoglobin A1c 5.7, spot urine protein creatinine ratio 0.1  Labs : dsDNA 9 (5-9 equivocal, positive is greater than or equal to 10), C3 and C4 normal, ESR 13, CBC normal, BMP normal  1    Medical problem list:   -Hypertension   -Hyperlipidemia   -Hypothyroidism   - SLE    Allergies: Reviewed as documented     Surgeries:    Cholecystectomy   Stapedectomy     Social history: .   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   Denies use of tobacco and alcohol..     Family history: Sister has SLE     ROS:  General: Denies fevers or chills. Has some fatigue.  CV: Denies chest pain or palpitations.  Denies leg edema.  Lungs: Denies coughing or shortness of breath.  Skin: Denies rashes or nodules.  MS: c/o pain at the base of both thumbs- see  "LEIGHTON for details.    Objective   /76 (BP Location: Left arm, Patient Position: Sitting, BP Cuff Size: Large adult)   Pulse 52   Temp 36.6 °C (97.8 °F)   Ht 1.753 m (5' 9\")   Wt 93.3 kg (205 lb 9.6 oz)   SpO2 98%   BMI 30.36 kg/m²     Physical Exam  HEENT: PERRL, EOMI  Neck: Supple, no nodes.  CV: RRR, no MGR.  Lungs: Clear, no rales or wheezes.  Abdomen: Soft, nontender. No hepatosplenomegaly.  Extremities:  No cyanosis, clubbing, or edema.  MS: No synovitis. Tender 1st CMC bilaterally.  Skin: No rashes or nodules.    Assessment/Plan   Problem List Items Addressed This Visit             ICD-10-CM    SLE (systemic lupus erythematosus) (Multi) M32.9    Relevant Orders    CBC and Auto Differential    Basic Metabolic Panel    C3 Complement    C4 Complement    Anti-DNA Antibody, Double-Stranded    Sedimentation Rate    Protein, Urine Random    Creatinine, Urine Random    BMI 30.0-30.9,adult Z68.30     Other Visit Diagnoses         Codes    Pain in both hands    -  Primary M79.641, M79.642    Relevant Orders    XR hand 3+ views bilateral                1. SLE- has positive BENITA, positive double-stranded DNA, arthralgias.  She does have mild erythema in the malar eminence, which does not extend into the nasolabial fold. She has telangiectasias on the bridge of her nose, which would be consistent with rosacea. She does note that the rash worsens with sun exposure, which is suggestive of a malar rash with SLE.     She started hydroxychloroquine September 2022, and is having significantly less joint pain.  Follow-up eye exam was done 4/24 with Dr. Solis.    She did have a brief flare November 17, 2023 when she developed a malar rash and generalized arthralgias.  This occurred shortly after doing a colonoscopy prep.  Symptoms resolved with a Medrol Dosepak.     2. BMI 30- stable. She will continue to work on weight loss.     3. Anterior left knee pain-likely patellofemoral OA. Recommend try PT recommend Voltaren " gel 4 g 4 times daily as needed..    4.  OA 1st CMC joint bilaterally- injected 5/13/24 by Dr. Cortez, only got 2 days of relief. She will follow up 6/20/24.    Plan:  Xray both hands.  Check labs: CBC with diff, BMP, ESR, dsDNA, C3, C4, spot urine protein to creatinine ratio.  Follow-up in 4 months.

## 2024-06-10 ENCOUNTER — HOSPITAL ENCOUNTER (OUTPATIENT)
Dept: RADIOLOGY | Facility: EXTERNAL LOCATION | Age: 59
Discharge: HOME | End: 2024-06-10
Payer: COMMERCIAL

## 2024-06-10 DIAGNOSIS — S93.612A: ICD-10-CM

## 2024-06-11 ENCOUNTER — HOSPITAL ENCOUNTER (OUTPATIENT)
Dept: RADIOLOGY | Facility: CLINIC | Age: 59
Discharge: HOME | End: 2024-06-11
Payer: COMMERCIAL

## 2024-06-11 ENCOUNTER — OFFICE VISIT (OUTPATIENT)
Dept: ORTHOPEDIC SURGERY | Facility: CLINIC | Age: 59
End: 2024-06-11
Payer: COMMERCIAL

## 2024-06-11 DIAGNOSIS — S92.302A AVULSION FRACTURE OF METATARSAL BONE OF LEFT FOOT, CLOSED, INITIAL ENCOUNTER: Primary | ICD-10-CM

## 2024-06-11 DIAGNOSIS — M25.472 ANKLE EFFUSION, LEFT: ICD-10-CM

## 2024-06-11 DIAGNOSIS — S93.492S SPRAIN OF ANTERIOR TALOFIBULAR LIGAMENT, LEFT, SEQUELA: ICD-10-CM

## 2024-06-11 DIAGNOSIS — S93.412S SPRAIN OF CALCANEOFIBULAR LIGAMENT OF LEFT ANKLE, SEQUELA: ICD-10-CM

## 2024-06-11 DIAGNOSIS — M79.672 LEFT FOOT PAIN: ICD-10-CM

## 2024-06-11 DIAGNOSIS — M25.372 ANKLE INSTABILITY, LEFT: ICD-10-CM

## 2024-06-11 PROCEDURE — 73630 X-RAY EXAM OF FOOT: CPT | Mod: LT

## 2024-06-11 PROCEDURE — 99214 OFFICE O/P EST MOD 30 MIN: CPT | Performed by: ORTHOPAEDIC SURGERY

## 2024-06-11 PROCEDURE — 73630 X-RAY EXAM OF FOOT: CPT | Mod: LEFT SIDE | Performed by: RADIOLOGY

## 2024-06-11 PROCEDURE — 3008F BODY MASS INDEX DOCD: CPT | Performed by: ORTHOPAEDIC SURGERY

## 2024-06-11 NOTE — PROGRESS NOTES
HISTORY OF PRESENT ILLNESS  This is a pleasant 59 y.o. year old female  who presents on 06/11/2024 at the request of Zaheer Buchanan DO for evaluation of the left foot and    pain that has been present over/since  6/10/24 .    How the condition occurred or started: She was walking and twisted her ankle  Location of pain (patient points to): lateral foot  Quality of pain: Moderate  Modifying Factors: worse with weightbearing  Associated Signs and symptoms: swelling  Previous Treatment: urgent care gave her post-op shoe  She has had numerous left ankle sprains in the past and inversion injuries, most recently on 6/10/24. She feels that the left ankle is not stable and she rolls or sprains her ankle once every 6 weeks or so.  She has tried physical therapy in the past for her left ankle but she still has recurrent instability episodes and falls; which concerns her.    Hx of prior 5th MT shaft and neck fracture    PHYSICAL EXAMINATION  Constitutional Exam: patient's height and weight reviewed, well-kempt  Psychiatric Exam: alert and oriented x 3, appropriate mood and behavior  Eye Exam: LINNEA, EOMI  Pulmonary Exam: breathing non-labored, no apparent distress  Lymphatic exam: no appreciable lymphadenopathy in the lower extremities  Cardiovascular exam: DP pulses 2+ bilaterally, PT 2+ bilaterally, toes are pink with good capillary refill, no pitting edema  Skin exam: no open lesions, rashes, abrasions or ulcerations  Neurological exam: sensation to light touch intact in both lower extremities in peripheral and dermatomal distributions (except for any abnormalities noted in musculoskeletal exam)    Musculoskeletal exam: left foot and ankle: very tender to palpation of the base of 5th metatarsal, no pain across shaft of metatarsals otherwise, no pain over medial malleolus, deltoid intact on ankle valgus stress testing, negative DF-eversion stress test, no proximal fibular pain on palpation, 2+ anterior drawer test and 2+  talar tilt test, neg shuck test, normal standing alignment, motor intact but sore, ankle effusion    Right ankle negative anterior drawer and talar tilt test    DATA/RESULTS REVIEW: I personally reviewed the patient's x-ray images and reports of the the left foot and ankle region showing subtle but small avulsion fracture line base 5th metatarsal where she has the worst pain clincally .     ASSESSMENT: Left foot small nondisplaced avulsion fracture fifth metatarsal base consistent with where she has pain on palpation, left ankle recurrent anterolateral instability episode with ATFL and CFL tearing and sequelae  PLAN: I discussed with the patient the differential diagnosis, complex repetitive trauma related nature of the condition(s) and available treatment option(s).  She has had significant issues with her left ankle with recurrent ankle inversion injuries up to 1 every 6 weeks.  She has had several fractures in her foot and ankle on the left related to these inversion injuries.  Unfortunately her left ankle instability is worsening despite her using bracing, activity modification physician directed, physical therapy, medications and other interventions.  Her left ankle does not feel trustworthy.  She has significant instability on exam.  We recommend MRI scan of the left ankle without contrast to assess her ligaments and for any chondral injury.  I certify the medical necessity of an MRI scan of the left ankle due to the above-noted issues and worsening instability despite a full course of conservative treatment.  Her latest inversion ankle sprain injury has resulted in another fracture, small fracture of the fifth metatarsal base.  Fortunately this avulsion fracture can be treated nonoperatively with postop shoe, takes typically 6 weeks to heal completely but most patients feel better at 4 weeks and can try to wean into a good athletic shoe with rigid sole.  Follow-up in 5 weeks or so and after the MRI scan is  completed on her left ankle.  We recommended that MRI be done in a few weeks once the acute swelling from her fifth metatarsal base fracture resolves to get better images.  The patient's questions were answered in detail.      Note dictated with Mebel[employer] software, completed without full type editing to avoid delay.    Office [phone]

## 2024-06-19 NOTE — PROGRESS NOTES
[facility]  Hand and Upper Extremity Service  Follow up visit         Follow up for: Bilateral hands, right worse than left    Interval History: Recently diagnosed with carpal tunnel, cubital tunnel syndrome, and CMC arthritis in which she received a right carpal tunnel injection on last visit and didn't receive much relief. She then acquired braces. She reports burning, stabbing, and achy sensations in her right hand mostly. She has been taking Motrin. She reports intermittent numbness in the right thumb and index finger but doesn't believe it's as bothersome. The symptoms have subsided in her thumb. She rolled her ankle recently and sustained a left 5th metacarpal base fracture and is currently in a walking boot. The fracture must heal first and she was told she'll need a lateral ankle ligament reconstruction during the summer. She is unable to do anything with her hand until she recovers from the lower extremity issues.               Past medical history, medications, allergies, surgical history and review of systems are reviewed and otherwise unchanged when compared to last visit on 5/13/24         Examination:  Constitutional: Oriented to person, place, and time.  Appears well-developed and well-nourished.  Head: Normocephalic and atraumatic.  Eyes: Pupils are equal, round, and reactive to light.  Cardiovascular: Intact distal pulses.  Pulmonary/Chest/Breast: Effort normal. No respiratory distress.  Neurological: Alert and oriented to person, place, and time.  Skin: Skin is warm and dry.  Psychiatric: normal mood and affect.  Behavior is normal.  Musculoskeletal: Right hand reveals normal appearance. Mild tenderness to palpation around base of thumb. No thenar atrophy. Mild tingling in thumb and index finger. Negative Tinel's sign. No subjective sensation changes in ring and small finger.       Personal Interpretation of Diagnostic studies: No new images obtained        Impression: Right thumb CMC arthritis, right carpal tunnel syndrome       Plan: She'll continue with her current treatments of braces, anti-inflammatory medications, and activity modifications. If her hand or thumb symptoms worsen during her recovery of upcoming foot and ankle surgery, I'm happy to see her again for repeat injections.       Follow up: As needed              Fabio Cortez MD  [facility]  Department of Orthopaedic Surgery  Hand and Upper Extremity Reconstruction    Scribe Attestation  By signing my name below, I, Conner Moya   attest that this documentation has been prepared under the direction and in the presence of Dr. Fabio Cortez.    Dictation performed with the use of voice recognition software.  Syntax and grammatical errors may exist.

## 2024-06-20 ENCOUNTER — OFFICE VISIT (OUTPATIENT)
Dept: ORTHOPEDIC SURGERY | Facility: CLINIC | Age: 59
End: 2024-06-20
Payer: COMMERCIAL

## 2024-06-20 VITALS — HEIGHT: 69 IN | WEIGHT: 205 LBS | BODY MASS INDEX: 30.36 KG/M2

## 2024-06-20 DIAGNOSIS — G56.03 CARPAL TUNNEL SYNDROME, BILATERAL: ICD-10-CM

## 2024-06-20 DIAGNOSIS — M19.049 CMC ARTHRITIS: Primary | ICD-10-CM

## 2024-06-20 PROCEDURE — 3008F BODY MASS INDEX DOCD: CPT | Performed by: ORTHOPAEDIC SURGERY

## 2024-06-20 PROCEDURE — 99213 OFFICE O/P EST LOW 20 MIN: CPT | Performed by: ORTHOPAEDIC SURGERY

## 2024-06-20 PROCEDURE — 1036F TOBACCO NON-USER: CPT | Performed by: ORTHOPAEDIC SURGERY

## 2024-06-20 ASSESSMENT — PAIN SCALES - GENERAL: PAINLEVEL_OUTOF10: 5 - MODERATE PAIN

## 2024-06-20 ASSESSMENT — PAIN DESCRIPTION - DESCRIPTORS: DESCRIPTORS: ACHING;SORE

## 2024-06-20 ASSESSMENT — PAIN - FUNCTIONAL ASSESSMENT: PAIN_FUNCTIONAL_ASSESSMENT: 0-10

## 2024-06-24 DIAGNOSIS — Z00.00 HEALTHCARE MAINTENANCE: Primary | ICD-10-CM

## 2024-07-05 ENCOUNTER — APPOINTMENT (OUTPATIENT)
Dept: RADIOLOGY | Facility: CLINIC | Age: 59
End: 2024-07-05
Payer: COMMERCIAL

## 2024-07-10 DIAGNOSIS — M32.9 SYSTEMIC LUPUS ERYTHEMATOSUS, UNSPECIFIED SLE TYPE, UNSPECIFIED ORGAN INVOLVEMENT STATUS (MULTI): ICD-10-CM

## 2024-07-10 RX ORDER — HYDROXYCHLOROQUINE SULFATE 200 MG/1
TABLET, FILM COATED ORAL 2 TIMES DAILY
Qty: 180 TABLET | Refills: 3 | Status: SHIPPED | OUTPATIENT
Start: 2024-07-10

## 2024-07-22 ENCOUNTER — HOSPITAL ENCOUNTER (OUTPATIENT)
Dept: RADIOLOGY | Facility: CLINIC | Age: 59
Discharge: HOME | End: 2024-07-22
Payer: COMMERCIAL

## 2024-07-22 ENCOUNTER — TELEPHONE (OUTPATIENT)
Dept: ORTHOPEDIC SURGERY | Facility: HOSPITAL | Age: 59
End: 2024-07-22
Payer: COMMERCIAL

## 2024-07-22 DIAGNOSIS — S93.412S SPRAIN OF CALCANEOFIBULAR LIGAMENT OF LEFT ANKLE, SEQUELA: ICD-10-CM

## 2024-07-22 DIAGNOSIS — S92.302A AVULSION FRACTURE OF METATARSAL BONE OF LEFT FOOT, CLOSED, INITIAL ENCOUNTER: ICD-10-CM

## 2024-07-22 DIAGNOSIS — S93.492S SPRAIN OF ANTERIOR TALOFIBULAR LIGAMENT, LEFT, SEQUELA: ICD-10-CM

## 2024-07-22 DIAGNOSIS — M25.472 ANKLE EFFUSION, LEFT: ICD-10-CM

## 2024-07-22 DIAGNOSIS — M25.372 ANKLE INSTABILITY, LEFT: ICD-10-CM

## 2024-07-22 PROCEDURE — 73721 MRI JNT OF LWR EXTRE W/O DYE: CPT | Mod: LT

## 2024-07-22 ASSESSMENT — PROMIS GLOBAL HEALTH SCALE
CARRYOUT_SOCIAL_ACTIVITIES: EXCELLENT
RATE_AVERAGE_FATIGUE: MILD
EMOTIONAL_PROBLEMS: NEVER
RATE_PHYSICAL_HEALTH: VERY GOOD
CARRYOUT_PHYSICAL_ACTIVITIES: COMPLETELY
RATE_GENERAL_HEALTH: VERY GOOD
RATE_AVERAGE_PAIN: 4
RATE_SOCIAL_SATISFACTION: EXCELLENT
RATE_MENTAL_HEALTH: EXCELLENT
RATE_QUALITY_OF_LIFE: EXCELLENT

## 2024-07-22 NOTE — TELEPHONE ENCOUNTER
Phone note: Left a detailed message concerning MRI results.  ATFL and CFL with chronic damage from sprains, ligaments thickened.  Also bone bruise inferior aspect of talus- contusion injury and will resolve on its own and may also be related to mild flatfoot.  I will try calling patient back to discuss further steps.  No OCD in talus or tibia which is good.  Her prior fracture of 5th metatarsal has healed.

## 2024-07-23 ENCOUNTER — APPOINTMENT (OUTPATIENT)
Dept: ORTHOPEDIC SURGERY | Facility: CLINIC | Age: 59
End: 2024-07-23
Payer: COMMERCIAL

## 2024-07-23 ENCOUNTER — LAB (OUTPATIENT)
Dept: LAB | Facility: LAB | Age: 59
End: 2024-07-23
Payer: COMMERCIAL

## 2024-07-23 DIAGNOSIS — Z00.00 HEALTHCARE MAINTENANCE: ICD-10-CM

## 2024-07-23 DIAGNOSIS — M32.9 SYSTEMIC LUPUS ERYTHEMATOSUS, UNSPECIFIED SLE TYPE, UNSPECIFIED ORGAN INVOLVEMENT STATUS (MULTI): ICD-10-CM

## 2024-07-23 LAB
ALBUMIN SERPL BCP-MCNC: 4.3 G/DL (ref 3.4–5)
ALP SERPL-CCNC: 77 U/L (ref 33–136)
ALT SERPL W P-5'-P-CCNC: 19 U/L (ref 7–45)
ANION GAP SERPL CALC-SCNC: 14 MMOL/L (ref 10–20)
AST SERPL W P-5'-P-CCNC: 16 U/L (ref 9–39)
BASOPHILS # BLD AUTO: 0.05 X10*3/UL (ref 0–0.1)
BASOPHILS NFR BLD AUTO: 1.1 %
BILIRUB DIRECT SERPL-MCNC: 0.2 MG/DL (ref 0–0.3)
BILIRUB SERPL-MCNC: 1.1 MG/DL (ref 0–1.2)
BUN SERPL-MCNC: 11 MG/DL (ref 6–23)
C3 SERPL-MCNC: 136 MG/DL (ref 87–200)
C4 SERPL-MCNC: 28 MG/DL (ref 10–50)
CALCIUM SERPL-MCNC: 9.8 MG/DL (ref 8.6–10.6)
CHLORIDE SERPL-SCNC: 105 MMOL/L (ref 98–107)
CHOLEST SERPL-MCNC: 179 MG/DL (ref 0–199)
CHOLESTEROL/HDL RATIO: 2.5
CO2 SERPL-SCNC: 30 MMOL/L (ref 21–32)
CREAT SERPL-MCNC: 0.92 MG/DL (ref 0.5–1.05)
CREAT UR-MCNC: 142.7 MG/DL (ref 20–320)
DSDNA AB SER-ACNC: 8 IU/ML
EGFRCR SERPLBLD CKD-EPI 2021: 71 ML/MIN/1.73M*2
EOSINOPHIL # BLD AUTO: 0.21 X10*3/UL (ref 0–0.7)
EOSINOPHIL NFR BLD AUTO: 4.6 %
ERYTHROCYTE [DISTWIDTH] IN BLOOD BY AUTOMATED COUNT: 13 % (ref 11.5–14.5)
ERYTHROCYTE [SEDIMENTATION RATE] IN BLOOD BY WESTERGREN METHOD: 8 MM/H (ref 0–30)
GLUCOSE SERPL-MCNC: 103 MG/DL (ref 74–99)
HCT VFR BLD AUTO: 41.2 % (ref 36–46)
HDLC SERPL-MCNC: 72.5 MG/DL
HGB BLD-MCNC: 13.4 G/DL (ref 12–16)
IMM GRANULOCYTES # BLD AUTO: 0.01 X10*3/UL (ref 0–0.7)
IMM GRANULOCYTES NFR BLD AUTO: 0.2 % (ref 0–0.9)
LDLC SERPL CALC-MCNC: 89 MG/DL
LYMPHOCYTES # BLD AUTO: 1.56 X10*3/UL (ref 1.2–4.8)
LYMPHOCYTES NFR BLD AUTO: 34.4 %
MCH RBC QN AUTO: 31.3 PG (ref 26–34)
MCHC RBC AUTO-ENTMCNC: 32.5 G/DL (ref 32–36)
MCV RBC AUTO: 96 FL (ref 80–100)
MONOCYTES # BLD AUTO: 0.34 X10*3/UL (ref 0.1–1)
MONOCYTES NFR BLD AUTO: 7.5 %
NEUTROPHILS # BLD AUTO: 2.36 X10*3/UL (ref 1.2–7.7)
NEUTROPHILS NFR BLD AUTO: 52.2 %
NON HDL CHOLESTEROL: 107 MG/DL (ref 0–149)
NRBC BLD-RTO: 0 /100 WBCS (ref 0–0)
PLATELET # BLD AUTO: 301 X10*3/UL (ref 150–450)
POTASSIUM SERPL-SCNC: 4.6 MMOL/L (ref 3.5–5.3)
PROT SERPL-MCNC: 6.8 G/DL (ref 6.4–8.2)
PROT UR-ACNC: 13 MG/DL (ref 5–24)
PROT/CREAT UR: 0.09 MG/MG CREAT (ref 0–0.17)
RBC # BLD AUTO: 4.28 X10*6/UL (ref 4–5.2)
SODIUM SERPL-SCNC: 144 MMOL/L (ref 136–145)
T4 FREE SERPL-MCNC: 1.41 NG/DL (ref 0.78–1.48)
TRIGL SERPL-MCNC: 87 MG/DL (ref 0–149)
TSH SERPL-ACNC: 7.39 MIU/L (ref 0.44–3.98)
VLDL: 17 MG/DL (ref 0–40)
WBC # BLD AUTO: 4.5 X10*3/UL (ref 4.4–11.3)

## 2024-07-23 PROCEDURE — 36415 COLL VENOUS BLD VENIPUNCTURE: CPT

## 2024-07-23 PROCEDURE — 84156 ASSAY OF PROTEIN URINE: CPT

## 2024-07-23 PROCEDURE — 80061 LIPID PANEL: CPT

## 2024-07-23 PROCEDURE — 86225 DNA ANTIBODY NATIVE: CPT

## 2024-07-23 PROCEDURE — 85652 RBC SED RATE AUTOMATED: CPT

## 2024-07-23 PROCEDURE — 82248 BILIRUBIN DIRECT: CPT

## 2024-07-23 PROCEDURE — 85025 COMPLETE CBC W/AUTO DIFF WBC: CPT

## 2024-07-23 PROCEDURE — 82570 ASSAY OF URINE CREATININE: CPT

## 2024-07-23 PROCEDURE — 80053 COMPREHEN METABOLIC PANEL: CPT

## 2024-07-23 PROCEDURE — 86160 COMPLEMENT ANTIGEN: CPT

## 2024-07-23 PROCEDURE — 84443 ASSAY THYROID STIM HORMONE: CPT

## 2024-07-23 PROCEDURE — 84439 ASSAY OF FREE THYROXINE: CPT

## 2024-07-26 ENCOUNTER — APPOINTMENT (OUTPATIENT)
Dept: PRIMARY CARE | Facility: CLINIC | Age: 59
End: 2024-07-26
Payer: COMMERCIAL

## 2024-07-26 VITALS
HEART RATE: 68 BPM | OXYGEN SATURATION: 99 % | RESPIRATION RATE: 16 BRPM | WEIGHT: 204 LBS | SYSTOLIC BLOOD PRESSURE: 120 MMHG | HEIGHT: 70 IN | TEMPERATURE: 97.8 F | BODY MASS INDEX: 29.2 KG/M2 | DIASTOLIC BLOOD PRESSURE: 70 MMHG

## 2024-07-26 DIAGNOSIS — Z00.00 HEALTHCARE MAINTENANCE: Primary | ICD-10-CM

## 2024-07-26 DIAGNOSIS — M77.9 TENDONITIS: ICD-10-CM

## 2024-07-26 DIAGNOSIS — E03.9 HYPOTHYROIDISM, UNSPECIFIED TYPE: ICD-10-CM

## 2024-07-26 PROCEDURE — 3008F BODY MASS INDEX DOCD: CPT | Performed by: INTERNAL MEDICINE

## 2024-07-26 PROCEDURE — 93000 ELECTROCARDIOGRAM COMPLETE: CPT | Performed by: INTERNAL MEDICINE

## 2024-07-26 PROCEDURE — 3074F SYST BP LT 130 MM HG: CPT | Performed by: INTERNAL MEDICINE

## 2024-07-26 PROCEDURE — 1036F TOBACCO NON-USER: CPT | Performed by: INTERNAL MEDICINE

## 2024-07-26 PROCEDURE — 3078F DIAST BP <80 MM HG: CPT | Performed by: INTERNAL MEDICINE

## 2024-07-26 PROCEDURE — 99396 PREV VISIT EST AGE 40-64: CPT | Performed by: INTERNAL MEDICINE

## 2024-07-26 RX ORDER — FAMOTIDINE 40 MG/1
40 TABLET, FILM COATED ORAL DAILY
Qty: 30 TABLET | Refills: 1 | Status: SHIPPED | OUTPATIENT
Start: 2024-07-26 | End: 2025-01-22

## 2024-07-26 RX ORDER — LEVOTHYROXINE SODIUM 200 UG/1
200 TABLET ORAL
Qty: 90 TABLET | Refills: 1 | Status: SHIPPED | OUTPATIENT
Start: 2024-07-26

## 2024-07-26 ASSESSMENT — ENCOUNTER SYMPTOMS
SLEEP DISTURBANCE: 1
CONSTIPATION: 0
SHORTNESS OF BREATH: 0
ABDOMINAL PAIN: 0
DIARRHEA: 0

## 2024-07-26 NOTE — PROGRESS NOTES
Patient here for a physical     Subjective   Patient ID: Gladys Norris is a 59 y.o. female who presents for Annual Exam.  The patient is planning to undergo corrective surgery in 9/2024 for the left foot for treatment of recurrent injuries including a fracture.  She is currently following with  from Orthopedic Surgery.      The patient is following with  from Orthopedic Surgery for bilateral carpel tunnel syndrome. She has been wearing wrist braces when she can, and finds that this is helping.      The patient denies any hearing impairment or vision changes and wears prescription lenses.  She follows regularly with a Dentist, and reports poor sleep quality.  The patient denies any dyspnea, chest pressure, chest pain, abdominal pain or bowel problems.      Review of Systems   HENT:  Negative for hearing loss.    Eyes:  Negative for visual disturbance.   Respiratory:  Negative for shortness of breath.    Cardiovascular:  Negative for chest pain.   Gastrointestinal:  Negative for abdominal pain, constipation and diarrhea.   Musculoskeletal:         Positive for left foot pain, bilateral upper extremity pain.   Psychiatric/Behavioral:  Positive for sleep disturbance.        Objective   Physical Exam  Constitutional:       Appearance: Normal appearance.   Neck:      Vascular: No carotid bruit.   Cardiovascular:      Rate and Rhythm: Normal rate and regular rhythm.      Heart sounds: Normal heart sounds.   Pulmonary:      Effort: Pulmonary effort is normal.      Breath sounds: Normal breath sounds.   Abdominal:      General: Bowel sounds are normal.      Palpations: Abdomen is soft.      Tenderness: There is no abdominal tenderness.   Skin:     General: Skin is warm and dry.   Neurological:      General: No focal deficit present.      Mental Status: She is alert and oriented to person, place, and time. Mental status is at baseline.   Psychiatric:         Mood and Affect: Mood normal.         Behavior:  Behavior normal.       Assessment/Plan   Problem List Items Addressed This Visit             ICD-10-CM    Hypothyroidism E03.9    Relevant Medications    levothyroxine (Synthroid, Levoxyl) 200 mcg tablet    Other Relevant Orders    Tsh With Reflex To Free T4 If Abnormal     Other Visit Diagnoses         Codes    Healthcare maintenance    -  Primary Z00.00    Relevant Orders    ECG 12 lead (Clinic Performed) (Completed)    Tendonitis     M77.9    Relevant Medications    famotidine (Pepcid) 40 mg tablet            CPE Performed  -  Discussed healthy diet and regular exercise.    -  Physical exam overall unremarkable. Immunizations reviewed and updated accordingly. Healthy lifestyle choices discussed (tobacco avoidance, appropriate alcohol use, avoidance of illicit substances).   -  Patient is wearing seatbelt.   -  Screening lab work ordered as indicated.    -  Age appropriate screening tests reviewed with patient.        EKG unremarkable compared to previous.    IMPRESSION/PLAN :       Hypothyroid   - Last TSH 7.39, T4 wnl - 7/2024.  Increase levothyroxine to 200 mcg every day.  Ordered repeat TSH 6 weeks from now.      HTN   - /70 in office today, continue on Losartan 100mg 1/2 tab daily.      HLD   - Stable, continue on atorvastatin 10mg QD.  Previously ordered CT Cardiac Score to establish cardiac risk.     Hyperglycemia / Prediabetes  - Last HbA1c 5.7% per 8/2023,  mg/dL per 5/18/2023.      Left Foot Fracture  - MR 7/2024 showed focal marrow edema at the undersurface of the talar head may represent contusion versus stress reaction. No discrete fracture line seen, remote avulsion is at the base of the 5th metatarsal, thickening of the anterior talofibular and of the calcaneofibular, ligament suggestive of a remote injury. Following  from Orthopedic Surgery.    Carpal Tunnel Syndrome   - I have advised the patient to try to wear a carpal tunnel wrist splint to see if this helps with her  symptom.      SLE   - Labs showed a weakly positive BENITA but Rheumatoid Factor, CRP, and citrulline antibody were unremarkable and ESR was cancelled by the lab on 7/2022. Started on hydroxychloroquine 200 mg BID on 10/3/2022, and experienced nausea w/o vomiting 1 week later. Follows up with  in Rheumatology.     Bilateral CTS  - Following with  from Orthopedics Hand     [facility] Maintenance   - Routine labs 7/2024. Last Pap wnl 7/2022. Last Mammogram 1/2024. Last colonoscopy performed 11/2023, repeat due 11/2028.     Follow up in 6 months, call sooner if needed.        Scribe Attestation  By signing my name below, I, Connre Daniels   attest that this documentation has been prepared under the direction and in the presence of Zaheer Buchanan DO.   Ibeth Carrera 07/26/24 8:11 AM

## 2024-08-06 ENCOUNTER — OFFICE VISIT (OUTPATIENT)
Dept: ORTHOPEDIC SURGERY | Facility: CLINIC | Age: 59
End: 2024-08-06
Payer: COMMERCIAL

## 2024-08-06 DIAGNOSIS — E03.9 HYPOTHYROIDISM, UNSPECIFIED TYPE: ICD-10-CM

## 2024-08-06 DIAGNOSIS — S93.492S SPRAIN OF ANTERIOR TALOFIBULAR LIGAMENT, LEFT, SEQUELA: Primary | ICD-10-CM

## 2024-08-06 DIAGNOSIS — M25.372 ANKLE INSTABILITY, LEFT: ICD-10-CM

## 2024-08-06 DIAGNOSIS — S93.412S SPRAIN OF CALCANEOFIBULAR LIGAMENT OF LEFT ANKLE, SEQUELA: ICD-10-CM

## 2024-08-06 PROCEDURE — 99214 OFFICE O/P EST MOD 30 MIN: CPT | Performed by: ORTHOPAEDIC SURGERY

## 2024-08-06 RX ORDER — LEVOTHYROXINE SODIUM 200 UG/1
200 TABLET ORAL
Qty: 90 TABLET | Refills: 1 | Status: SHIPPED | OUTPATIENT
Start: 2024-08-06 | End: 2024-08-06 | Stop reason: SDUPTHER

## 2024-08-06 RX ORDER — LEVOTHYROXINE SODIUM 200 UG/1
200 TABLET ORAL
Qty: 30 TABLET | Refills: 0 | Status: SHIPPED | OUTPATIENT
Start: 2024-08-06

## 2024-08-06 NOTE — PROGRESS NOTES
This is a pleasant 59 y.o. year old female who presents for fuv of  left ankle.  She continues to have give way episodes and pain in the ankle; anterolateral and also worsens pain anteriorly if she squats down when gardening.   Pain also along dorsolateral foot.  Interventions: She has tried, ice, anti-inflammatory medications, physical therapy, bracing in past but persistent feeling that left ankle will give way and recurrent ankle sprains.  PMH lupus on plaquenil, HTN, controlled  Takes baby ASA daily    PHYSICAL EXAMINATION  Constitutional Exam: patient's height and weight reviewed, well-kempt  Psychiatric Exam: alert and oriented x 3, appropriate mood and behavior  Eye Exam: LINNEA, EOMI  Pulmonary Exam: breathing non-labored, no apparent distress  Lymphatic exam: no appreciable lymphadenopathy in the lower extremities  Cardiovascular exam: DP pulses 2+ bilaterally, PT 2+ bilaterally, toes are pink with good capillary refill, no pitting edema  Skin exam: no open lesions, rashes, abrasions or ulcerations  Neurological exam: sensation to light touch intact in both lower extremities in peripheral and dermatomal distributions (except for any abnormalities noted in musculoskeletal exam)    Musculoskeletal exam: left foot and ankle: mild hyperpronation, sore over anterolateral joint line with passive forced dorsiflexion testing, 2+ anterior drawer, 1+ talar tilt, no pain along peroneals, no pain on palpation of 4th and 5th metatarsals, negative ankle valgus stress test, negative DF-eversion stress test, no pain to palpation of PTT or talar head/neck region, negative passive hyperplantarflexion testing    DATA/RESULTS REVIEW: I personally reviewed the patient's mri images and reports of the  left ankle showing ATFL chronic injury with thickening and laxity, no OCD, bone edema talar head area and other areas per radiologist, steida process posterior talus but not symptomatic clinically .  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  Discussed with her procedure would involve left ankle arthroscopy with extensive debridement to remove the ankle impingement soft tissue and spurs followed by open Broström Coles reconstruction augmented to address the ATFL laxity.  Risks of procedure include pain, bleeding, infection, wound healing problems, soft tissue healing problems, hardware related complications, chronic ankle swelling or stiffness, progression of underlying ankle arthritis, injury nerves or vessels, DVT, PE and other medical complications.  She would have to be nonweightbearing for 2 to 3 weeks after surgery (splint then cast) followed by weightbearing in a boot.  Early recovery is 2 to 3 months.  Return to work likely around the 3-month point.  Physical therapy protocol started after placement into a boot.  Discussed with her that lupus can make wound healing a little bit more challenging but she is well-controlled on minimal medications.  She and I discussed her reaching out to her rheumatologist with respect to if it is okay to continue with the Plaquenil or if it would be okay or appropriate to decrease the dosing for 3 weeks after surgery to help with wound healing.  Discussed with her options with respect to DVT prophylaxis.  She is currently on baby aspirin.  1 option would be to increase to the 325 mg aspirin dosing, versus use of Xarelto.  We discussed risks and benefits of each medication for DVT prophylaxis.  At this time, she and I selected 325mg ASA so that she can use anti-inflammatory medications for post-op pain.  She does not have any personal history of any blood clots.  No family history of any blood  clotting disorders; except that father did have blood clot issue related to his medical conditions.  Discussed use of knee walker, shower chair, shower stool and other appliances to help with her postop recovery.  Would recommend that she use Spenco type full-length inserts to help with her mild hyperpronation which will help relieve the dorsal lateral foot pain usually.  Her alignment is nearly normal and so I would not recommend any flatfoot reconstruction or surgery.  The patient's questions were answered in detail.      I discussed with patient the procedure in detail, risks and benefits of procedure, post-op protocol, anesthetic used with possible regional block, timeline of recovery, need for protective weightbearing and/or bracing after procedure, pain management protocol, DVT prophylaxis protocol, home preparation suggestions, pre-op surgery preparation, and other pertinent information.    Note dictated with [employer] software, completed without full type editing to avoid delay.

## 2024-08-09 ENCOUNTER — APPOINTMENT (OUTPATIENT)
Dept: RADIOLOGY | Facility: HOSPITAL | Age: 59
End: 2024-08-09
Payer: COMMERCIAL

## 2024-08-12 DIAGNOSIS — E03.9 HYPOTHYROIDISM, UNSPECIFIED TYPE: ICD-10-CM

## 2024-08-12 RX ORDER — LEVOTHYROXINE SODIUM 200 UG/1
200 TABLET ORAL
Qty: 90 TABLET | Refills: 3 | Status: SHIPPED | OUTPATIENT
Start: 2024-08-12

## 2024-09-03 DIAGNOSIS — E03.9 HYPOTHYROIDISM, UNSPECIFIED TYPE: ICD-10-CM

## 2024-09-03 RX ORDER — LEVOTHYROXINE SODIUM 200 UG/1
200 TABLET ORAL
Qty: 90 TABLET | Refills: 3 | Status: SHIPPED | OUTPATIENT
Start: 2024-09-03

## 2024-09-09 ENCOUNTER — PREP FOR PROCEDURE (OUTPATIENT)
Dept: ORTHOPEDIC SURGERY | Facility: HOSPITAL | Age: 59
End: 2024-09-09
Payer: COMMERCIAL

## 2024-09-09 DIAGNOSIS — S93.412S SPRAIN OF CALCANEOFIBULAR LIGAMENT OF LEFT ANKLE, SEQUELA: ICD-10-CM

## 2024-09-09 DIAGNOSIS — E78.2 MIXED HYPERLIPIDEMIA: ICD-10-CM

## 2024-09-09 DIAGNOSIS — S93.492S SPRAIN OF ANTERIOR TALOFIBULAR LIGAMENT OF LEFT ANKLE, SEQUELA: ICD-10-CM

## 2024-09-09 DIAGNOSIS — N95.1 MENOPAUSAL SYMPTOM: ICD-10-CM

## 2024-09-09 PROBLEM — S93.412A SPRAIN OF CALCANEOFIBULAR LIGAMENT OF LEFT ANKLE: Status: ACTIVE | Noted: 2024-09-09

## 2024-09-09 PROBLEM — S93.492A SPRAIN OF ANTERIOR TALOFIBULAR LIGAMENT OF LEFT ANKLE: Status: ACTIVE | Noted: 2024-09-09

## 2024-09-09 RX ORDER — ATORVASTATIN CALCIUM 10 MG/1
10 TABLET, FILM COATED ORAL DAILY
Qty: 90 TABLET | Refills: 3 | Status: SHIPPED | OUTPATIENT
Start: 2024-09-09

## 2024-09-09 RX ORDER — ESTRADIOL AND NORETHINDRONE ACETATE .5; .1 MG/1; MG/1
1 TABLET ORAL DAILY
Qty: 84 TABLET | Refills: 1 | Status: SHIPPED | OUTPATIENT
Start: 2024-09-09

## 2024-09-23 ENCOUNTER — PREP FOR PROCEDURE (OUTPATIENT)
Dept: ORTHOPEDIC SURGERY | Facility: HOSPITAL | Age: 59
End: 2024-09-23
Payer: COMMERCIAL

## 2024-09-23 RX ORDER — CEFAZOLIN SODIUM 2 G/50ML
2 SOLUTION INTRAVENOUS ONCE
Status: CANCELLED | OUTPATIENT
Start: 2024-09-23 | End: 2024-09-23

## 2024-09-23 RX ORDER — SODIUM CHLORIDE, SODIUM LACTATE, POTASSIUM CHLORIDE, CALCIUM CHLORIDE 600; 310; 30; 20 MG/100ML; MG/100ML; MG/100ML; MG/100ML
20 INJECTION, SOLUTION INTRAVENOUS CONTINUOUS
Status: CANCELLED | OUTPATIENT
Start: 2024-09-23

## 2024-09-24 ENCOUNTER — LAB (OUTPATIENT)
Dept: LAB | Facility: LAB | Age: 59
End: 2024-09-24
Payer: COMMERCIAL

## 2024-09-24 ENCOUNTER — ANESTHESIA EVENT (OUTPATIENT)
Dept: OPERATING ROOM | Facility: CLINIC | Age: 59
End: 2024-09-24
Payer: COMMERCIAL

## 2024-09-24 DIAGNOSIS — E03.9 HYPOTHYROIDISM, UNSPECIFIED TYPE: ICD-10-CM

## 2024-09-24 LAB
T4 FREE SERPL-MCNC: 1.59 NG/DL (ref 0.78–1.48)
TSH SERPL-ACNC: 4.08 MIU/L (ref 0.44–3.98)

## 2024-09-24 PROCEDURE — 84439 ASSAY OF FREE THYROXINE: CPT

## 2024-09-24 PROCEDURE — 36415 COLL VENOUS BLD VENIPUNCTURE: CPT

## 2024-09-24 PROCEDURE — 84443 ASSAY THYROID STIM HORMONE: CPT

## 2024-09-25 ENCOUNTER — ANESTHESIA (OUTPATIENT)
Dept: OPERATING ROOM | Facility: CLINIC | Age: 59
End: 2024-09-25
Payer: COMMERCIAL

## 2024-09-25 ENCOUNTER — HOSPITAL ENCOUNTER (OUTPATIENT)
Facility: CLINIC | Age: 59
Setting detail: OUTPATIENT SURGERY
Discharge: HOME | End: 2024-09-25
Attending: ORTHOPAEDIC SURGERY | Admitting: ORTHOPAEDIC SURGERY
Payer: COMMERCIAL

## 2024-09-25 ENCOUNTER — HOSPITAL ENCOUNTER (OUTPATIENT)
Dept: RADIOLOGY | Facility: CLINIC | Age: 59
Setting detail: OUTPATIENT SURGERY
Discharge: HOME | End: 2024-09-25
Payer: COMMERCIAL

## 2024-09-25 VITALS
TEMPERATURE: 97.2 F | WEIGHT: 200.62 LBS | DIASTOLIC BLOOD PRESSURE: 64 MMHG | SYSTOLIC BLOOD PRESSURE: 135 MMHG | OXYGEN SATURATION: 99 % | BODY MASS INDEX: 29.71 KG/M2 | HEIGHT: 69 IN | RESPIRATION RATE: 16 BRPM | HEART RATE: 75 BPM

## 2024-09-25 DIAGNOSIS — S93.492A SPRAIN OF ANTERIOR TALOFIBULAR LIGAMENT OF LEFT ANKLE, INITIAL ENCOUNTER: Primary | ICD-10-CM

## 2024-09-25 PROCEDURE — 7100000002 HC RECOVERY ROOM TIME - EACH INCREMENTAL 1 MINUTE: Performed by: ORTHOPAEDIC SURGERY

## 2024-09-25 PROCEDURE — 2500000001 HC RX 250 WO HCPCS SELF ADMINISTERED DRUGS (ALT 637 FOR MEDICARE OP): Performed by: ANESTHESIOLOGIST ASSISTANT

## 2024-09-25 PROCEDURE — A29898 PR ANKLE SCOPE,EXTENS DEBRIDEMNT: Performed by: ANESTHESIOLOGY

## 2024-09-25 PROCEDURE — 2500000004 HC RX 250 GENERAL PHARMACY W/ HCPCS (ALT 636 FOR OP/ED): Performed by: ANESTHESIOLOGIST ASSISTANT

## 2024-09-25 PROCEDURE — 7100000009 HC PHASE TWO TIME - INITIAL BASE CHARGE: Performed by: ORTHOPAEDIC SURGERY

## 2024-09-25 PROCEDURE — 2500000005 HC RX 250 GENERAL PHARMACY W/O HCPCS: Performed by: ANESTHESIOLOGIST ASSISTANT

## 2024-09-25 PROCEDURE — 2500000001 HC RX 250 WO HCPCS SELF ADMINISTERED DRUGS (ALT 637 FOR MEDICARE OP): Performed by: ANESTHESIOLOGY

## 2024-09-25 PROCEDURE — 2720000007 HC OR 272 NO HCPCS: Performed by: ORTHOPAEDIC SURGERY

## 2024-09-25 PROCEDURE — 64447 NJX AA&/STRD FEMORAL NRV IMG: CPT | Performed by: ANESTHESIOLOGY

## 2024-09-25 PROCEDURE — 7100000010 HC PHASE TWO TIME - EACH INCREMENTAL 1 MINUTE: Performed by: ORTHOPAEDIC SURGERY

## 2024-09-25 PROCEDURE — C1713 ANCHOR/SCREW BN/BN,TIS/BN: HCPCS | Performed by: ORTHOPAEDIC SURGERY

## 2024-09-25 PROCEDURE — 2780000003 HC OR 278 NO HCPCS: Performed by: ORTHOPAEDIC SURGERY

## 2024-09-25 PROCEDURE — 3600000002 HC OR TIME - INITIAL BASE CHARGE - PROCEDURE LEVEL TWO: Performed by: ORTHOPAEDIC SURGERY

## 2024-09-25 PROCEDURE — 76000 FLUOROSCOPY <1 HR PHYS/QHP: CPT | Mod: 59

## 2024-09-25 PROCEDURE — 3700000002 HC GENERAL ANESTHESIA TIME - EACH INCREMENTAL 1 MINUTE: Performed by: ORTHOPAEDIC SURGERY

## 2024-09-25 PROCEDURE — 2500000004 HC RX 250 GENERAL PHARMACY W/ HCPCS (ALT 636 FOR OP/ED): Performed by: ORTHOPAEDIC SURGERY

## 2024-09-25 PROCEDURE — 3600000007 HC OR TIME - EACH INCREMENTAL 1 MINUTE - PROCEDURE LEVEL TWO: Performed by: ORTHOPAEDIC SURGERY

## 2024-09-25 PROCEDURE — 2500000002 HC RX 250 W HCPCS SELF ADMINISTERED DRUGS (ALT 637 FOR MEDICARE OP, ALT 636 FOR OP/ED): Performed by: ANESTHESIOLOGIST ASSISTANT

## 2024-09-25 PROCEDURE — 3700000001 HC GENERAL ANESTHESIA TIME - INITIAL BASE CHARGE: Performed by: ORTHOPAEDIC SURGERY

## 2024-09-25 PROCEDURE — A29898 PR ANKLE SCOPE,EXTENS DEBRIDEMNT: Performed by: ANESTHESIOLOGIST ASSISTANT

## 2024-09-25 PROCEDURE — 64445 NJX AA&/STRD SCIATIC NRV IMG: CPT | Performed by: ANESTHESIOLOGY

## 2024-09-25 PROCEDURE — 7100000001 HC RECOVERY ROOM TIME - INITIAL BASE CHARGE: Performed by: ORTHOPAEDIC SURGERY

## 2024-09-25 DEVICE — SUTURE ANCHOR, DX FIBERTAK P2 MTS, W/NDL: Type: IMPLANTABLE DEVICE | Site: ANKLE | Status: FUNCTIONAL

## 2024-09-25 RX ORDER — OXYCODONE AND ACETAMINOPHEN 5; 325 MG/1; MG/1
1-2 TABLET ORAL EVERY 6 HOURS PRN
Qty: 20 TABLET | Refills: 0 | Status: SHIPPED | OUTPATIENT
Start: 2024-09-25 | End: 2024-09-30

## 2024-09-25 RX ORDER — ONDANSETRON HYDROCHLORIDE 2 MG/ML
INJECTION, SOLUTION INTRAVENOUS AS NEEDED
Status: DISCONTINUED | OUTPATIENT
Start: 2024-09-25 | End: 2024-09-25

## 2024-09-25 RX ORDER — DOCUSATE SODIUM 100 MG/1
100 CAPSULE, LIQUID FILLED ORAL 2 TIMES DAILY
Qty: 28 CAPSULE | Refills: 0 | Status: SHIPPED | OUTPATIENT
Start: 2024-09-25 | End: 2024-10-09

## 2024-09-25 RX ORDER — METHOCARBAMOL 100 MG/ML
INJECTION, SOLUTION INTRAMUSCULAR; INTRAVENOUS AS NEEDED
Status: DISCONTINUED | OUTPATIENT
Start: 2024-09-25 | End: 2024-09-25

## 2024-09-25 RX ORDER — SCOLOPAMINE TRANSDERMAL SYSTEM 1 MG/1
PATCH, EXTENDED RELEASE TRANSDERMAL AS NEEDED
Status: DISCONTINUED | OUTPATIENT
Start: 2024-09-25 | End: 2024-09-25

## 2024-09-25 RX ORDER — HYDROMORPHONE HYDROCHLORIDE 1 MG/ML
INJECTION, SOLUTION INTRAMUSCULAR; INTRAVENOUS; SUBCUTANEOUS AS NEEDED
Status: DISCONTINUED | OUTPATIENT
Start: 2024-09-25 | End: 2024-09-25

## 2024-09-25 RX ORDER — ALBUTEROL SULFATE 0.83 MG/ML
2.5 SOLUTION RESPIRATORY (INHALATION) ONCE AS NEEDED
Status: DISCONTINUED | OUTPATIENT
Start: 2024-09-25 | End: 2024-09-25 | Stop reason: HOSPADM

## 2024-09-25 RX ORDER — GABAPENTIN 300 MG/1
CAPSULE ORAL AS NEEDED
Status: DISCONTINUED | OUTPATIENT
Start: 2024-09-25 | End: 2024-09-25

## 2024-09-25 RX ORDER — MIDAZOLAM HYDROCHLORIDE 1 MG/ML
INJECTION, SOLUTION INTRAMUSCULAR; INTRAVENOUS AS NEEDED
Status: DISCONTINUED | OUTPATIENT
Start: 2024-09-25 | End: 2024-09-25

## 2024-09-25 RX ORDER — SODIUM CHLORIDE, SODIUM LACTATE, POTASSIUM CHLORIDE, CALCIUM CHLORIDE 600; 310; 30; 20 MG/100ML; MG/100ML; MG/100ML; MG/100ML
20 INJECTION, SOLUTION INTRAVENOUS CONTINUOUS
Status: DISCONTINUED | OUTPATIENT
Start: 2024-09-25 | End: 2024-09-25 | Stop reason: HOSPADM

## 2024-09-25 RX ORDER — HYDROMORPHONE HYDROCHLORIDE 1 MG/ML
0.4 INJECTION, SOLUTION INTRAMUSCULAR; INTRAVENOUS; SUBCUTANEOUS EVERY 5 MIN PRN
Status: DISCONTINUED | OUTPATIENT
Start: 2024-09-25 | End: 2024-09-25 | Stop reason: HOSPADM

## 2024-09-25 RX ORDER — SODIUM CHLORIDE, SODIUM LACTATE, POTASSIUM CHLORIDE, AND CALCIUM CHLORIDE .6; .31; .03; .02 G/100ML; G/100ML; G/100ML; G/100ML
IRRIGANT IRRIGATION AS NEEDED
Status: DISCONTINUED | OUTPATIENT
Start: 2024-09-25 | End: 2024-09-25 | Stop reason: HOSPADM

## 2024-09-25 RX ORDER — SODIUM CHLORIDE, SODIUM LACTATE, POTASSIUM CHLORIDE, CALCIUM CHLORIDE 600; 310; 30; 20 MG/100ML; MG/100ML; MG/100ML; MG/100ML
INJECTION, SOLUTION INTRAVENOUS CONTINUOUS PRN
Status: DISCONTINUED | OUTPATIENT
Start: 2024-09-25 | End: 2024-09-25

## 2024-09-25 RX ORDER — CEFAZOLIN 1 G/1
INJECTION, POWDER, FOR SOLUTION INTRAVENOUS AS NEEDED
Status: DISCONTINUED | OUTPATIENT
Start: 2024-09-25 | End: 2024-09-25

## 2024-09-25 RX ORDER — ACETAMINOPHEN 325 MG/1
TABLET ORAL AS NEEDED
Status: DISCONTINUED | OUTPATIENT
Start: 2024-09-25 | End: 2024-09-25

## 2024-09-25 RX ORDER — LIDOCAINE HYDROCHLORIDE 20 MG/ML
INJECTION, SOLUTION INFILTRATION; PERINEURAL AS NEEDED
Status: DISCONTINUED | OUTPATIENT
Start: 2024-09-25 | End: 2024-09-25

## 2024-09-25 RX ORDER — CEFAZOLIN SODIUM 2 G/100ML
2 INJECTION, SOLUTION INTRAVENOUS ONCE
Status: DISCONTINUED | OUTPATIENT
Start: 2024-09-25 | End: 2024-09-25 | Stop reason: HOSPADM

## 2024-09-25 RX ORDER — APREPITANT 40 MG/1
CAPSULE ORAL AS NEEDED
Status: DISCONTINUED | OUTPATIENT
Start: 2024-09-25 | End: 2024-09-25

## 2024-09-25 RX ORDER — PHENYLEPHRINE HCL IN 0.9% NACL 0.4MG/10ML
SYRINGE (ML) INTRAVENOUS AS NEEDED
Status: DISCONTINUED | OUTPATIENT
Start: 2024-09-25 | End: 2024-09-25

## 2024-09-25 RX ORDER — METOCLOPRAMIDE HYDROCHLORIDE 5 MG/ML
10 INJECTION INTRAMUSCULAR; INTRAVENOUS ONCE AS NEEDED
Status: DISCONTINUED | OUTPATIENT
Start: 2024-09-25 | End: 2024-09-25 | Stop reason: HOSPADM

## 2024-09-25 RX ORDER — ONDANSETRON HYDROCHLORIDE 2 MG/ML
4 INJECTION, SOLUTION INTRAVENOUS ONCE AS NEEDED
Status: DISCONTINUED | OUTPATIENT
Start: 2024-09-25 | End: 2024-09-25 | Stop reason: HOSPADM

## 2024-09-25 RX ORDER — OXYCODONE HYDROCHLORIDE 5 MG/1
5 TABLET ORAL EVERY 4 HOURS PRN
Status: DISCONTINUED | OUTPATIENT
Start: 2024-09-25 | End: 2024-09-25 | Stop reason: HOSPADM

## 2024-09-25 RX ORDER — NORETHINDRONE AND ETHINYL ESTRADIOL 0.5-0.035
KIT ORAL AS NEEDED
Status: DISCONTINUED | OUTPATIENT
Start: 2024-09-25 | End: 2024-09-25

## 2024-09-25 RX ORDER — ONDANSETRON 4 MG/1
4 TABLET, FILM COATED ORAL EVERY 8 HOURS PRN
Qty: 6 TABLET | Refills: 0 | Status: SHIPPED | OUTPATIENT
Start: 2024-09-25 | End: 2024-09-27

## 2024-09-25 RX ORDER — CELECOXIB 200 MG/1
CAPSULE ORAL AS NEEDED
Status: DISCONTINUED | OUTPATIENT
Start: 2024-09-25 | End: 2024-09-25

## 2024-09-25 RX ORDER — SODIUM CHLORIDE, SODIUM LACTATE, POTASSIUM CHLORIDE, CALCIUM CHLORIDE 600; 310; 30; 20 MG/100ML; MG/100ML; MG/100ML; MG/100ML
100 INJECTION, SOLUTION INTRAVENOUS CONTINUOUS
Status: DISCONTINUED | OUTPATIENT
Start: 2024-09-25 | End: 2024-09-25 | Stop reason: HOSPADM

## 2024-09-25 RX ORDER — LIDOCAINE IN NACL,ISO-OSMOT/PF 30 MG/3 ML
0.1 SYRINGE (ML) INJECTION ONCE
Status: DISCONTINUED | OUTPATIENT
Start: 2024-09-25 | End: 2024-09-25 | Stop reason: HOSPADM

## 2024-09-25 RX ORDER — HYDROMORPHONE HYDROCHLORIDE 0.2 MG/ML
0.2 INJECTION INTRAMUSCULAR; INTRAVENOUS; SUBCUTANEOUS EVERY 5 MIN PRN
Status: DISCONTINUED | OUTPATIENT
Start: 2024-09-25 | End: 2024-09-25 | Stop reason: HOSPADM

## 2024-09-25 RX ORDER — FENTANYL CITRATE 50 UG/ML
INJECTION, SOLUTION INTRAMUSCULAR; INTRAVENOUS AS NEEDED
Status: DISCONTINUED | OUTPATIENT
Start: 2024-09-25 | End: 2024-09-25

## 2024-09-25 RX ORDER — PROPOFOL 10 MG/ML
INJECTION, EMULSION INTRAVENOUS AS NEEDED
Status: DISCONTINUED | OUTPATIENT
Start: 2024-09-25 | End: 2024-09-25

## 2024-09-25 SDOH — HEALTH STABILITY: MENTAL HEALTH: CURRENT SMOKER: 0

## 2024-09-25 ASSESSMENT — PAIN DESCRIPTION - DESCRIPTORS
DESCRIPTORS: ACHING

## 2024-09-25 ASSESSMENT — PAIN - FUNCTIONAL ASSESSMENT
PAIN_FUNCTIONAL_ASSESSMENT: 0-10

## 2024-09-25 ASSESSMENT — PAIN SCALES - GENERAL
PAINLEVEL_OUTOF10: 4
PAINLEVEL_OUTOF10: 0 - NO PAIN
PAINLEVEL_OUTOF10: 4
PAINLEVEL_OUTOF10: 4
PAINLEVEL_OUTOF10: 0 - NO PAIN
PAINLEVEL_OUTOF10: 2

## 2024-09-25 ASSESSMENT — PAIN DESCRIPTION - LOCATION: LOCATION: OTHER (COMMENT)

## 2024-09-25 NOTE — ANESTHESIA PROCEDURE NOTES
Airway  Date/Time: 9/25/2024 7:50 AM  Urgency: elective    Airway not difficult    Staffing  Performed: ANDRÉS   Authorized by: Teddy Varela MD    Performed by: ANDRÉS Ritchie  Patient location during procedure: OR    Indications and Patient Condition  Spontaneous ventilation: present  Sedation level: deep  Preoxygenated: yes  Mask difficulty assessment: 0 - not attempted  Planned trial extubation    Final Airway Details  Final airway type: supraglottic airway      Successful airway: Size 3     Number of attempts at approach: 1  Number of other approaches attempted: 0    Additional Comments  Lips/teeth in pre-anesthetic condition.

## 2024-09-25 NOTE — ANESTHESIA PREPROCEDURE EVALUATION
Patient: Gladys Norris    Procedure Information       Anesthesia Start Date/Time: 09/25/24 0741    Procedure: Left ankle arthroscopy with extensive debridement, lateral ligament reconstruction- Brostrum Coles (Left: Ankle)    [facility]    Surgeons: Penny Luciano MD            Relevant Problems   Cardiac   (+) Benign essential hypertension   (+) Hyperlipidemia      Neuro   (+) Cubital tunnel syndrome, right   (+) Right carpal tunnel syndrome   (+) Ulnar nerve impingement      Endocrine   (+) Hypothyroidism      Musculoskeletal   (+) Right carpal tunnel syndrome   (+) SLE (systemic lupus erythematosus) (Multi)      HEENT   (+) Conductive hearing loss in right ear   (+) Mixed conductive and sensorineural hearing loss of right ear with restricted hearing of left ear   (+) Seasonal allergies       Clinical information reviewed:   Tobacco  Allergies  Meds   Med Hx  Surg Hx  OB Status  Fam Hx  Soc   Hx        NPO Detail:  NPO/Void Status  Date of Last Liquid: 09/24/24  Time of Last Liquid: 2000  Date of Last Solid: 09/24/24  Time of Last Solid: 2000  Last Intake Type: Light meal         Physical Exam    Airway  Mallampati: II  TM distance: >3 FB  Neck ROM: full     Cardiovascular - normal exam  Rhythm: regular  Rate: normal     Dental - normal exam     Pulmonary - normal exam  Breath sounds clear to auscultation     Abdominal        Anesthesia Plan    History of general anesthesia?: yes  History of complications of general anesthesia?: no    ASA 2     general and regional   (The risk/ benefits of both GA and the Popliteal Nerve block)  The patient is not a current smoker.    intravenous induction   Anesthetic plan and risks discussed with patient and spouse.    Plan discussed with CAA.

## 2024-09-25 NOTE — ANESTHESIA PROCEDURE NOTES
Peripheral Block    Patient location during procedure: pre-op  Reason for block: procedure for pain and at surgeon's request  Staffing  Performed: attending   Authorized by: Teddy Varela MD    Performed by: Teddy Varela MD  Preanesthetic Checklist  Completed: patient identified, IV checked, site marked, risks and benefits discussed, surgical consent, monitors and equipment checked, pre-op evaluation and timeout performed   Timeout performed at:   Peripheral Block  Patient position: laying flat  Prep: ChloraPrep  Patient monitoring: heart rate  Block type: femoral and popliteal  Laterality: right  Injection technique: single-shot  Guidance: nerve stimulator and ultrasound guided  Local infiltration: lidocaine  Infiltration strength: 1 %  Dose: 3 mL  Needle  Needle gauge: 22 G  Needle length: 8 cm  Needle localization: nerve stimulator, ultrasound guidance and anatomical landmarks  Test dose: negative  Assessment  Injection assessment: negative aspiration for heme, no paresthesia on injection, incremental injection and local visualized surrounding nerve on ultrasound  Paresthesia pain: none  Heart rate change: no  Slow fractionated injection: yes  Additional Notes  Single shot nerve block performed under direct ultrasound guidance.  Site cleaned with chloraprep x 2.  Procedure done under strict sterile technique.  Skin localized with 1% Lidocaine.  Appropriate structures identified with ultrasound imaging.   PAJUNK needle inserted under US visualization.   2% Lidocaine with epi 1:200K + 0.5% Ropivacaine with epi 1:200K injected under direct ultrasound guidance.  Serial aspirations every 5ml.  Aspirations negative for heme.  Negative paresthesias.  Patient tolerated procedure well without immediate complications.    Good motor sensory changes noted prior to induction.

## 2024-09-25 NOTE — H&P
History Of Present Illness  Gladys Norris is a 59 y.o. female presenting for left ankle surgery. Denies any changes to her health.     Past Medical History  She has a past medical history of Abnormal uterine and vaginal bleeding, unspecified (2014), Autoimmune disorder (Multi), Hypertension, Hypothyroidism, Lupus (Multi), Palpitations (2014), Personal history of diseases of the skin and subcutaneous tissue (2014), Personal history of other diseases of the digestive system (2014), Personal history of other diseases of the musculoskeletal system and connective tissue, Personal history of other diseases of the musculoskeletal system and connective tissue, and Pure hypercholesterolemia, unspecified.    Surgical History  She has a past surgical history that includes Gallbladder surgery (2015);  section, classic (2015); Other surgical history (2015); and Shoulder surgery (Left).     Social History  She reports that she has never smoked. She has never used smokeless tobacco. She reports current alcohol use. She reports that she does not use drugs.    Family History  Family History   Problem Relation Name Age of Onset    Coronary artery disease Mother      Heart failure Mother      Osteoarthritis Mother      Arthritis Father      Heart failure Father      COPD Father      Deep vein thrombosis Father      Diabetes Father      Cancer Father      Lupus Sister      Ovarian cancer Paternal Grandmother      Breast cancer Maternal Cousin          Allergies  Adhesive tape-silicones, Grass pollen, Ketamine, Oak, Ofloxacin, Tuberculin ppd, Adhesive, Latex, and Sulfamethoxazole    Review of Systems   All other systems reviewed and are negative.       Physical Exam  Constitutional:       Appearance: Normal appearance.   Cardiovascular:      Rate and Rhythm: Normal rate and regular rhythm.   Pulmonary:      Effort: Pulmonary effort is normal.   Neurological:      Mental Status: She is  "alert.        LLE: no skin lesions.   N/V intact.     Last Recorded Vitals  Blood pressure 151/72, pulse 60, temperature 35.6 °C (96.1 °F), temperature source Temporal, resp. rate 16, height 1.753 m (5' 9\"), weight 91 kg (200 lb 9.9 oz), SpO2 99%.    Relevant Results      Scheduled medications    Continuous medications    PRN medications    Results for orders placed or performed in visit on 09/24/24 (from the past 24 hour(s))   Tsh With Reflex To Free T4 If Abnormal   Result Value Ref Range    Thyroid Stimulating Hormone 4.08 (H) 0.44 - 3.98 mIU/L   Thyroxine, Free   Result Value Ref Range    Thyroxine, Free 1.59 (H) 0.78 - 1.48 ng/dL       Assessment/Plan   Assessment & Plan  Sprain of anterior talofibular ligament of left ankle    Sprain of calcaneofibular ligament of left ankle      OR this AM.   Home post-op       I spent 10 minutes in the professional and overall care of this patient.      Mario Boggs MD    "

## 2024-09-25 NOTE — ANESTHESIA PROCEDURE NOTES
Peripheral Block    Patient location during procedure: pre-op  Start time: 9/25/2024 7:24 AM  End time: 9/25/2024 7:34 AM  Reason for block: at surgeon's request and post-op pain management  Staffing  Performed: attending   Authorized by: Teddy Varela MD    Performed by: Teddy Varela MD  Preanesthetic Checklist  Completed: patient identified, IV checked, site marked, risks and benefits discussed, surgical consent, monitors and equipment checked, pre-op evaluation and timeout performed   Timeout performed at: 9/25/2024 7:21 AM  Peripheral Block  Patient position: laying flat  Prep: ChloraPrep and site prepped and draped  Patient monitoring: heart rate and continuous pulse ox  Block type: popliteal  Laterality: left  Injection technique: single-shot  Guidance: nerve stimulator and ultrasound guided  Local infiltration: lidocaine  Infiltration strength: 1 %  Dose: 3 mL  Needle  Needle gauge: 22 G  Needle length: 8 cm  Needle localization: nerve stimulator, ultrasound guidance and anatomical landmarks  Test dose: negative  Assessment  Injection assessment: negative aspiration for heme, no paresthesia on injection, incremental injection and local visualized surrounding nerve on ultrasound  Paresthesia pain: none  Heart rate change: no  Slow fractionated injection: yes  Additional Notes  Single shot nerve block performed under direct ultrasound guidance.  Site cleaned with chloraprep x 2.  Procedure done under strict sterile technique.  Skin localized with 1% Lidocaine.  Appropriate structures identified with ultrasound imaging.  80 mm PAJUNK needle inserted under US visualization.       Foot flexion to 0.48 mAmp    10 ml 2% Lidocaine with epi 1:200K + 20 ml 0.5% Ropivacaine with epi 1:200K injected under direct ultrasound guidance.  Serial aspirations every 5ml.  Aspirations negative for heme.  Negative paresthesias.  Patient tolerated procedure well without immediate complications.    Good  posterior/lateral calf motor sensory changes noted prior to induction.

## 2024-09-25 NOTE — DISCHARGE INSTRUCTIONS
Scopalamine patch may stay on for 72 hrs.  Remove patch, discard away form pets, children.  Do not touch eyes!  Wash hands thoroughly!    TO REACH YOUR PHYSICIAN AFTER HOURS CALL  AND ASK FOR THE PHYSICIAN ON CALL Orthopedic Surgery (watch for signs of infection: fever, chills, rash, hives, yellow/green drainage on dressing)  If bleeding noted on splint please call the office as soon as possible.    Dr. Luciano's  #   Scheduling [phone]                             #   Office  [phone]    Tylenol taken at 7:15 am due when needed    Do not taken ibuprofen products, NSAIDS, or aspirin products while taking Xarelto    Oxycodone 5 mg taken at 12:30 pm-take as directed on prescription

## 2024-09-25 NOTE — BRIEF OP NOTE
Date: 2024  [place] [facility] [place] [facility]    Name: Gladys Norris, : 1965, Age: 59 y.o., MRN: [id number], Sex: female    Diagnosis  Pre-op Diagnosis      * Sprain of anterior talofibular ligament of left ankle, sequela [S93.492S]     * Sprain of calcaneofibular ligament of left ankle, sequela [S93.412S]  Anterior ankle impingement left Post-op Diagnosis     * Sprain of anterior talofibular ligament of left ankle, sequela [S93.492S]     * Sprain of calcaneofibular ligament of left ankle, sequela [S93.412S]  Anterior ankle impingement large hypertrophic plical bands and adhesion formation     Procedures  Left ankle arthroscopy with extensive debridement, lateral ligament reconstruction- Brostrum Coles  81115 - ND ARTHROSCOPY ANKLE SURGICAL DEBRIDEMENT EXTENSIVE  ND RPR PRIM DISRUPTED LIGM ANKLE COLTR LIGMS [54872]    Surgeons      * Penny Luciano - Primary    Resident/Fellow/Other Assistant:  Surgeons and Role:  * Mario Boggs MD    Procedure Summary  Anesthesia: General  ASA: ASA status not filed in the log.  Anesthesia Staff: Anesthesiologist: Teddy Varela MD  C-AA: ANDRÉS Ritchie  Estimated Blood Loss: 5mL  Intra-op Medications:   Administrations occurring from 0730 to 1045 on 24:   Medication Name Total Dose   lactated Ringer's irrigation solution 12,000 mL              Anesthesia Record               Intraprocedure I/O Totals          Intake    LR infusion 700.00 mL    Total Intake 700 mL       Output    Est. Blood Loss 5 mL    Total Output 5 mL       Net    Net Volume 695 mL          Specimen: No specimens collected     Staff:   Circulator: Deepak Donald Person: Sarah  Circulator: Lilliana Dodd Circulator: Lilliana  Findings: intra-articular plical bands and adhesions, mild chondral damage     Complications:  None; patient tolerated the procedure well.     Disposition: PACU - hemodynamically stable.  Condition: stable  Specimens Collected: No specimens collected  Attending  Attestation: I was present and scrubbed for the entire procedure.    Penny Luciano  Phone Number: [phone]

## 2024-09-25 NOTE — ANESTHESIA POSTPROCEDURE EVALUATION
Patient: Gladys Norris    Procedure Summary       Date: 09/25/24 Room / Location[facility] [place] [facility]    Anesthesia Start: 0741 Anesthesia Stop: 1107    Procedure: Left ankle arthroscopy with extensive debridement, lateral ligament reconstruction- Brostrum Coles (Left: Ankle) Diagnosis:       Sprain of anterior talofibular ligament of left ankle, sequela      Sprain of calcaneofibular ligament of left ankle, sequela      (Sprain of anterior talofibular ligament of left ankle, sequela [S93.492S])      (Sprain of calcaneofibular ligament of left ankle, sequela [S93.412S])    Surgeons: Penny Luciano MD Responsible Provider: Teddy Varela MD    Anesthesia Type: general ASA Status: 2            Anesthesia Type: general    Vitals Value Taken Time   /58 09/25/24 1115   Temp 36 °C (96.8 °F) 09/25/24 1106   Pulse 71 09/25/24 1115   Resp 16 09/25/24 1115   SpO2 99 % 09/25/24 1115       Anesthesia Post Evaluation    Patient location during evaluation: bedside  Patient participation: complete - patient participated  Level of consciousness: awake  Pain management: satisfactory to patient  Multimodal analgesia pain management approach  Airway patency: patent  Cardiovascular status: acceptable  Respiratory status: acceptable  Hydration status: acceptable  Postoperative Nausea and Vomiting: none  Comments: Did well    There were no known notable events for this encounter.

## 2024-09-25 NOTE — OP NOTE
Left ankle arthroscopy with extensive debridement, lateral ligament reconstruction- Brostrum Coles (L) Operative Note     Date: 2024  [place] [facility] [place] [facility] [place]    Name: Gladys Norris : 1965, Age: 59 y.o., MRN: [id number], Sex: female    Diagnosis  Pre-op Diagnosis      * Sprain of anterior talofibular ligament of left ankle, sequela [S93.492S]     * Sprain of calcaneofibular ligament of left ankle, sequela [S93.412S]  Anterior ankle impingement left  Post-op Diagnosis     * Sprain of anterior talofibular ligament of left ankle, sequela [S93.492S]     * Sprain of calcaneofibular ligament of left ankle, sequela [S93.412S]  Anterior ankle impingement left     Procedures  Left ankle arthroscopy with extensive debridement, lateral ligament reconstruction- Brostrum Coles  82690 - [place] ARTHROSCOPY ANKLE SURGICAL DEBRIDEMENT EXTENSIVE  [place] RPR PRIM DISRUPTED LIGM ANKLE COLTR LIGMS [64939]     Surgeons      * Penny Luciano - Primary    Resident/Fellow/Other Assistant:  Surgeons and Role:  * Mario Boggs MD    Procedure Summary  Anesthesia: General  ASA: II  Anesthesia Staff: Anesthesiologist: Teddy Varela MD  C-AA: ANDRÉS Ritchie  Estimated Blood Loss: less 5mL  Intra-op Medications:   Administrations occurring from 0730 to 1045 on 24:   Medication Name Total Dose   lactated Ringer's irrigation solution 12,000 mL              Anesthesia Record               Intraprocedure I/O Totals          Intake    LR infusion 700.00 mL    Total Intake 700 mL       Output    Est. Blood Loss 5 mL    Total Output 5 mL       Net    Net Volume 695 mL          Specimen: No specimens collected     Staff:   Circulator: Deepak Donald Person: Sarah  Circulator: Lilliana Dodd Circulator: Lilliana     Drains and/or Catheters: * None in log *    Tourniquet Times:     Total Tourniquet Time Documented:  Thigh (Left) - 150 minutes  Total: Thigh (Left) - 150 minutes      Implants:  Implants       Type Name Action  Serial No.      Implant SUTURE ANCHOR, DX FIBERTAK P2 MTS, W/NDL - [id number] Wasted      Implant SUTURE ANCHOR, DX FIBERTAK P2 MTS, W/NDL - [id number] Wasted      Implant SUTURE ANCHOR, DX FIBERTAK P2 MTS, W/NDL - [id number] Implanted      Implant SUTURE ANCHOR, DX FIBERTAK P2 MTS, W/NDL - [id number] Implanted           Indications: Gladys Norris is an 59 y.o. female who is having surgery for Sprain of anterior talofibular ligament of left ankle, sequela [S93.492S]  Sprain of calcaneofibular ligament of left ankle, sequela [S93.412S].  After discussing the alternatives of treatment in detail with the patient, the patient selected surgical intervention and/or reconstruction.  We discussed with the patient risks and benefits of the procedure(s).  Risks of surgery were reviewed including pain, bleeding, infection, wound healing problems, soft tissue or bone healing problems, injury to nerves or vessels, implant or hardware related complications, chronic extremity stiffness or pain or swelling, post-traumatic arthritis, recurrent symptoms, DVT, PE and other medical complications.  After the patient's questions were answered in detail including post-operative course requiring nonweightbearing and the extensive rehabilitation needed after surgery, the patient then gave informed consent for the procedure.    DESCRIPTION OF PROCEDURE  The patient was identified in the pre-operative area and the left ankle surgical extremity was marked with a skin marker to designate surgical site.  Anesthesia consult placed popliteal block without complication.  Patient then was brought back to the operating room.  A huddle was called and confirmed upon entry into the operating room as per protocol.  Time out was called and confirmed prior to skin incision.  General anesthetic was administered and LMA placed without complication.  Patient received IV Ancef prior to skin incision as per protocol.  DVT prophylaxis was performed using stocking  and SCD application on well leg.  A well-padded tourniquet was placed on the [ ] and was elevated to 280mmHg for 150 minutes during the case.  The patient then was carefully positioned on the beanbag with bump underneath the ipsilateral hip to help with ankle access during the case.  All superficial nerve areas and bony prominences were well-padded and protected during the case and neutral spinal alignment maintained.  The operative leg was placed in a well-padded Ferkel leg noe to protect the neurovascular structures.  We then proceeded to prep and drape in the usual standard sterile fashion.The sterile ankle stirrup and distractor system was gently applied to the operative ankle.  Anatomical landmarks of the left ankle were then identified and anteromedial portal localized with an 18 gauge spinal needle.  A small nick in the skin was made with an 11 blade and portal was then made with a mosquito hemostat.  We then introduced the 2.7mm 30 degree arthroscope into the ankle joint.  Looking anterolaterally, we then localized our anterolateral portal under direct visualization with an 18 gauge spinal needle.  Tommie and spread technique was then used to create the anterolateral portal.  We then performed diagnostic arthroscopy which revealed numerous multiple large hypertrophic posttraumatic plical bands and adhesions extending through the anterior compartment of the ankle from the AITFL area and distal lateral tibia all the way across to the medial malleolus and medial talus.  Some adhesions also present between the tibia and the talus centrally also.  Additionally there was some chondromalacia of the distal aspect of the talar dome anteriorly and grade 2 impaction chondromalacia of the junction between the plafond and the medial malleolus anteriorly.  Meniscoid lesion in the syndesmosis noted but AITFL and PITFL intact.  Mild chondromalacia grade 2 over the posterior most aspect of the tibia.  Spur present over the  central and lateral aspect of the distal tibia anteriorly.  Spur present over the anterior medial talus distal to the dome.  We then proceeded with extensive debridement of the hypertrophic plicae and adhesion bands utilizing oscillating shaver.  We switched portals to complete removal of these thick large bands.  Fortunately the cartilage underlying the bands for the most part was intact, except for the distal aspect of the dome there was some chondromalacia.  We then removed synovitis in the medial gutter and anterior medial aspect of the ankle, we then elevated the soft tissue off of the anterior aspect of the distal tibia to expose the central and central lateral tibial spur.  We switched portals to remove some of the synovitis in the lateral gutter and remove the syndesmotic meniscoid lesion.  We then elevated the capsule off of the anterior aspect of distal tibia to finish exposure of the tibial spur.  We then utilized 2.9 mm bur and remove the tibial spur.  We then checked the anterior lateral talar neck region and no spur formation.  We then switched portals and addressed the anterior medial spur on the talar neck.  Bur was utilized to remove the spur, ankle was held in dorsiflexion to allow access to the entirety of the spur.  There was some talar beaking of the dorsal aspect right at the head region on her preoperative x-rays-this beaking is not affecting her ankle dorsiflexion range of motion and so we did not want to significantly disrupt all the soft tissues to get down to that talonavicular joint region as it raises risk of bleeding and complications.  This beaking is not affecting her ankle range of motion and we confirmed that by having the ankle hyperdorsiflexed while having the scope visualization and there was no soft tissue or bony impingement between the tibia and talus in full hyperdorsiflexion.  We removed loose chondral flaps at the distal aspect of the talar dome carefully with shaver and  removed some chondral flaps at the junction between the plafond and medial malleolus anteriorly.  We then copiously irrigated the ankle joint.  Once the arthroscopy was completed, the operative ankle was carefully taken out of the ankle stirrup and leg noe removed.  Portals then were closed with 4-0 nylon sutures.  We then proceeded with left open repair of lateral collateral ligament of ankle ATFL Kem Coles augmented with internal brace.  2+ anterior drawer test noted preoperatively and also under examination under anesthesia.  Talar tilt test trace on examination under anesthesia.  We utilized a curvilinear incision going from the distal fibula towards the talar neck.  Incision was made with 15 blade.  Bipolar cautery was used for hemostasis.  We identified the inferior extensor retinaculum and tagged it with 2-0 Vicryl stitches.  We then released the retinaculum with a Lake blade.  We then noted the footprint for the ATFL on the fibula and marked the superior and inferior aspects with use of the bipolar cautery so that we could place the anchors directly into the bone more easily.  We then utilized a hemostat and went underneath the ATFL which was present but hypertrophic and lax.  We then used a Lake blade to release the ATFL towards the fibular attachment point.  We then proceeded to place fiber tack knotless anchor on the anterior aspect.  We drilled and placed the anchor however when we went to set the anchor the anchor dislodged.  We drilled and placed the central anchor fiber tack knotless without issue and excellent deployment noted.  Bone was better in that location with respect to quality.  We then proceeded with Silvestre-Devin suture utilizing the repair stitch from the anchor.  We held the ankle in 30 degrees of plantarflexion and eversion.  We then proceeded to pass the repair stitch through the passing suture loop.  We then passed the repair stitch and then tensioned our repair advancing the  ATFL to the fibula nicely.  We then set excellent tension of the repair stitch.  We then trimmed the suture.  We then utilized 0 FiberWire to do advancing Silvestre-Devin type stitch on the superior aspect of the ATFL.  We then held the ankle in 30 degrees of plantarflexion and eversion as we tied down the FiberWire with excellent advancement repair of the ATFL superior aspect.  We had nice tensioning of the ATFL and negative anterior drawer test was achieved.  We then proceeded with placement of the internal brace.  We placed retractor and utilized the guidepin for the swivel lock anchor for the talus.  We identified the footprint of the ATFL on the talus and confirmed start point to be appropriate with lateral x-ray of the ankle using the mini fluoroscopy C arm machine.  Center of the talar neck noted along with guidepin entering just at the shoulder created from the superior aspect of the lateral process of the talus.  We then advanced the guidepin under fluoroscopic imaging and confirmed good position.  We then proceeded to overdrill with reamer and copious irrigation.  We then proceeded to tap and excellent bone quality noted.  We then remove the tap and the guidepin keeping the retractor in place.  We then seated the swivel lock anchor  into the talus.  We confirmed good position and then utilized a mallet to advance and then placed the interference screw with excellent bony purchase and fixation of the internal brace on the talus.  We then removed the .  We then identified the appropriate position for the internal brace on the fibula and utilized a guidepin and drilled appropriately approximately 45 degree angle into the distal fibula.  We then overdrilled and tapped.  We then brought the ankle into neutral dorsiflexion, placed a hemostat underneath the internal brace suture tape and then tensioned the suture tape where it entered into the tunnel on the fibular side.  We then folded up the suture  onto the  shaft where there was a black mariola and marked our suture tape at that black mariola line using a sterile marker.  We then put the eyelet of the swivel lock anchor at that line that we made on the suture.  This was done to prevent overtensioning or overtightening.  We then dunked the second 3.5 mm swivel lock anchor into the fibular channel.  We then utilized a mallet to advance.  We then advanced the interference screw with excellent bony purchase and fixation of the internal brace on the fibula.   was removed.  Full range of motion of the ankle was noted.  Negative anterior drawer test and negative talar tilt test noted.  We then proceed with copious irrigation.  We then held the ankle in slight eversion as we repaired the inferior extensor retinaculum utilizing 2-0 Vicryl mattress sutures.  We then completed closure approximately so that there would be a watertight deep closure covering all the suture.  We then irrigated again and then closed the subdermal tissue with interrupted 4-0 Vicryl suture.  Finally skin was closed with 4-0 nylon.  Tourniquet was released and all toes were pink and warm with distal pulses intact.  Dressing including xeroform, fluffs, ABD's, soft roll was applied.  Patient was placed in a well-padded short leg splint posterior and sugar-tong and dressing completed with ace wraps.    The patient was transported to the PACU in stable condition.  Patient will be non-weightbearing on their operative ankle with crutches, knee walker or wheelchair.  Patient fulfilled post-procedure discharge criteria and was released home.  Patient and patient representatives were given detailed discharge instructions and home-going medications including Percocet for severe pain only, Zofran, Senna and DVT prophylaxis with Xarelto to be started on postop day 1.  We discussed with the patient the different medications available for DVT prophylaxis and after discussion of risks and benefits  the patient selected the above.  Splint care reviewed and splint will be kept in place until follow-up in the office in 10-14 days.  Findings were discussed with the patient and their representative after the procedure and questions were answered in detail.      Complications:  None; patient tolerated the procedure well.    Disposition: PACU - hemodynamically stable.  Condition: stable   Attending Attestation: I was present and scrubbed for the entire procedure.    Penny Luciano  Phone Number: [phone]

## 2024-09-28 DIAGNOSIS — E03.9 HYPOTHYROIDISM, UNSPECIFIED TYPE: ICD-10-CM

## 2024-09-28 DIAGNOSIS — E03.9 HYPOTHYROIDISM, UNSPECIFIED TYPE: Primary | ICD-10-CM

## 2024-09-29 ENCOUNTER — PATIENT MESSAGE (OUTPATIENT)
Dept: PRIMARY CARE | Facility: CLINIC | Age: 59
End: 2024-09-29
Payer: COMMERCIAL

## 2024-09-29 DIAGNOSIS — E03.9 HYPOTHYROIDISM, UNSPECIFIED TYPE: ICD-10-CM

## 2024-09-30 DIAGNOSIS — S93.492S SPRAIN OF ANTERIOR TALOFIBULAR LIGAMENT, LEFT, SEQUELA: ICD-10-CM

## 2024-09-30 DIAGNOSIS — M25.372 ANKLE INSTABILITY, LEFT: ICD-10-CM

## 2024-09-30 DIAGNOSIS — S93.412S SPRAIN OF CALCANEOFIBULAR LIGAMENT OF LEFT ANKLE, SEQUELA: Primary | ICD-10-CM

## 2024-09-30 DIAGNOSIS — S93.412S SPRAIN OF CALCANEOFIBULAR LIGAMENT OF LEFT ANKLE, SEQUELA: ICD-10-CM

## 2024-09-30 DIAGNOSIS — M25.372 ANKLE INSTABILITY, LEFT: Primary | ICD-10-CM

## 2024-09-30 RX ORDER — LEVOTHYROXINE SODIUM 175 UG/1
175 TABLET ORAL
Qty: 90 TABLET | Refills: 3 | Status: SHIPPED | OUTPATIENT
Start: 2024-09-30 | End: 2024-09-30 | Stop reason: WASHOUT

## 2024-09-30 RX ORDER — DABIGATRAN ETEXILATE 150 MG/1
150 CAPSULE ORAL 2 TIMES DAILY
Qty: 60 CAPSULE | Refills: 0 | Status: SHIPPED | OUTPATIENT
Start: 2024-09-30

## 2024-09-30 RX ORDER — LEVOTHYROXINE SODIUM 200 UG/1
200 TABLET ORAL
Qty: 90 TABLET | Refills: 1 | Status: SHIPPED | OUTPATIENT
Start: 2024-09-30

## 2024-10-03 ENCOUNTER — OFFICE VISIT (OUTPATIENT)
Dept: ORTHOPEDIC SURGERY | Facility: CLINIC | Age: 59
End: 2024-10-03
Payer: COMMERCIAL

## 2024-10-03 DIAGNOSIS — S93.412A SPRAIN OF CALCANEOFIBULAR LIGAMENT OF LEFT ANKLE, INITIAL ENCOUNTER: Primary | ICD-10-CM

## 2024-10-03 DIAGNOSIS — S93.492A SPRAIN OF ANTERIOR TALOFIBULAR LIGAMENT OF LEFT ANKLE, INITIAL ENCOUNTER: ICD-10-CM

## 2024-10-03 DIAGNOSIS — S93.412S SPRAIN OF CALCANEOFIBULAR LIGAMENT OF LEFT ANKLE, SEQUELA: ICD-10-CM

## 2024-10-03 DIAGNOSIS — M25.372 ANKLE INSTABILITY, LEFT: ICD-10-CM

## 2024-10-03 DIAGNOSIS — S93.492S SPRAIN OF ANTERIOR TALOFIBULAR LIGAMENT, LEFT, SEQUELA: ICD-10-CM

## 2024-10-03 PROCEDURE — 99211 OFF/OP EST MAY X REQ PHY/QHP: CPT | Performed by: PHYSICIAN ASSISTANT

## 2024-10-03 RX ORDER — ENOXAPARIN SODIUM 100 MG/ML
40 INJECTION SUBCUTANEOUS DAILY
Qty: 14 EACH | Refills: 0 | Status: SHIPPED | OUTPATIENT
Start: 2024-10-03

## 2024-10-03 NOTE — PATIENT INSTRUCTIONS
You were placed in a cast/splint to be nonweightbearing with your crutches. DO NOT place any weight on your cast/splint. It is important to keep your cast/splint elevated supported at the calf with NO pressure on the heel to reduce swelling.    You were given rx for lovenox injections to do daily for dvt prophylaxis    Follow up in 2 weeks for suture removal

## 2024-10-08 ENCOUNTER — APPOINTMENT (OUTPATIENT)
Dept: ORTHOPEDIC SURGERY | Facility: CLINIC | Age: 59
End: 2024-10-08
Payer: COMMERCIAL

## 2024-10-10 NOTE — PROGRESS NOTES
59 y.o.female here for pov s/p left ankle arthroscopy with extensive debridement and open lateral ligament reconstruction Brostrum-Coles procedure on 9/25/24  1. Sprain of calcaneofibular ligament of left ankle, initial encounter  enoxaparin (Lovenox) 40 mg/0.4 mL syringe      2. Sprain of anterior talofibular ligament of left ankle, initial encounter  enoxaparin (Lovenox) 40 mg/0.4 mL syringe      3. Sprain of anterior talofibular ligament, left, sequela        4. Ankle instability, left        5. Sprain of calcaneofibular ligament of left ankle, sequela          . Patient reports they are doing well. No pain.  NWB on splint. Taking ASA      On examination of left ankle, incision is clean/dry/intact.  Sutures are intact.  No bleeding or drainage. Healing well.  Minimal swelling. Improved ROM and strength.  Neurovascularly intact.  Normal sensation to light touch.  Dorsalis pedis and posterior tibial pulses 2+ bilaterally. wiggles toes, calf soft and nontender      A/P: s/p left ankle arthroscopy with extensive debridement and open lateral ligament reconstruction Brostrum-Coles procedure on 9/25/24  -  Patient is doing well  - Splint removed; incision cleaned and dressed    - Patient was placed in a well padded fiberglass cast/splint to be nonweightbearing with crutches. They will keep the cast/splint elevated supported at the calf with NO pressure on the heel to reduce swelling.  - Continue your aspirin daily with food for blood clot prevention  - All the patient's questions were answered. The patient agrees with the above plan.  Follow up in 2 weeks for suture removal.

## 2024-10-15 ENCOUNTER — OFFICE VISIT (OUTPATIENT)
Dept: ORTHOPEDIC SURGERY | Facility: CLINIC | Age: 59
End: 2024-10-15
Payer: COMMERCIAL

## 2024-10-15 DIAGNOSIS — S93.492A SPRAIN OF ANTERIOR TALOFIBULAR LIGAMENT OF LEFT ANKLE, INITIAL ENCOUNTER: ICD-10-CM

## 2024-10-15 DIAGNOSIS — S93.412A SPRAIN OF CALCANEOFIBULAR LIGAMENT OF LEFT ANKLE, INITIAL ENCOUNTER: Primary | ICD-10-CM

## 2024-10-15 DIAGNOSIS — S93.412A SPRAIN OF CALCANEOFIBULAR LIGAMENT OF LEFT ANKLE, INITIAL ENCOUNTER: ICD-10-CM

## 2024-10-15 PROCEDURE — 99211 OFF/OP EST MAY X REQ PHY/QHP: CPT | Performed by: ORTHOPAEDIC SURGERY

## 2024-10-15 NOTE — PROGRESS NOTES
This is a pleasant 59 y.o. year old female who presents for fuv of left ankle.  She is doing well, minimal pain.  Taking ASA daily.    PHYSICAL EXAMINATION  Skin exam: no open lesions, rashes, abrasions or ulcerations  Neurological exam: sensation to light touch intact in both lower extremities in peripheral and dermatomal distributions (except for any abnormalities noted in musculoskeletal exam)  Musculoskeletal exam: left ankle: wounds healed and sutures removed, wiggles toes, toes pink and warm with good capillary refill    ASSESSMENT: Left ankle arthroscopy with extensive debridement, lateral ligament reconstruction- Calros Alberto Coles 9/25/24  PLAN: Further treatment options discussed including start WBAT in boot with crutches or walker.  Instructed her on initial ROM exercises, can start riding exercise bike next week on low resistance with athletic shoe on left foot to work motion, wrote prescription for PT and gave her rehab protocol.  FUV in 4 weeks.  Continue ASA until out of the boot.  The patient's questions were answered in detail.      Note dictated with [email or web address] software, completed without full type editing to avoid delay.

## 2024-10-18 ENCOUNTER — APPOINTMENT (OUTPATIENT)
Dept: PHYSICAL THERAPY | Facility: CLINIC | Age: 59
End: 2024-10-18
Payer: COMMERCIAL

## 2024-10-18 PROBLEM — M79.672 LEFT FOOT PAIN: Status: ACTIVE | Noted: 2024-10-18

## 2024-10-18 NOTE — PROGRESS NOTES
Physical Therapy Evaluation    Patient Name: Gladys Norris  MRN: [id number]                            Current Problem  1. Left foot pain          Insurance    Insurance reviewed   Visit number: 1  Approved number of visits: 60  [employer] 60V PT OT [facility] [employer] 0 DED [phone]) 4000(1957) COVERAGE 80 OOP 4000(1957) 8000(2043) NO AUTH REQ REF       Subjective   General:  Pt reports to the clinic following a Left ankle arthroscopy with extensive debridement, lateral ligament reconstruction- Carlos Alberto Coles on 9/25/24 with Dr. Penny Luciano. Sx started on ___ with no specific FABY or came on insidiously. Pt states this is/is not a reoccurring problem. Sx are aggravated by ____ and relieved by ___. D/t pain pt is limited with ____ and ADLs and iADLs. Pt would like to return to ____ following completion of PT. Patient denies any abnormal/calf pain, acute/abnormal lower leg swelling, shortness of breath or any overt signs of DVT/PE. Patient also denies any acute irritation of surgical site, no odorous/abnormally colored discharge or overt signs of infection. Denies any falls. Of note pt has PMH of ____. Pt has previously seen ____ for Sx.     Occupation:[Patient’s current job and any physical demands of their work]  [Previous occupations, if relevant to the current issue]    Lifestyle:[Detail any relevant aspects of the patient’s lifestyle, such as exercise habits, recreational activities, and hobbies]    Living Environment:  [Describe the patient's home setup and any potential environmental factors that may affect their condition]    Precautions:    Pain:  Description of Symptoms:    Pain: [Detail the type of pain: sharp, dull, throbbing, aching, etc.]  Location: [Specify the area of pain or discomfort]  Severity: [Rate the pain on a scale of 0-10, where 0 is no pain and 10 is the worst pain imaginable]  Intensity Variation: [Does the pain vary in intensity? If so, describe the variations]  Duration of Pain: [Is the pain  constant or intermittent? How long does it last?]  Aggravating Factors: [What activities or movements worsen the symptoms?]  Relieving Factors: [What actions or treatments alleviate the symptoms?]  Associated Symptoms: [Are there any other symptoms accompanying the primary complaint, such as numbness, tingling, weakness, or swelling?]  Functional Limitations:    [How has the condition affected the patient's daily activities, work, and leisure activities?]    Reviewed medical screening form with pt and medical screening assessed    Imaging:     Objective   Foot/Ankle Musculoskeletal Exam      Outcome Measures:  {PT Outcome Measures:97472}     Treatment:     EDUCATION/HEP:    Assessment:      Clinical Presentation: Stable    Level of Complexity: Low     Goals:  Pt will be independent with advanced HEP   Pt will increase R ankle strength to 5/5 to improve stability with all functional mobility  Pt will demo full and symmetrical R ankle ROM to normalize mechanics with all functional mobility  Pt will demo R LE SLS >/=10 sec without UE assist on compliant surface for functional carryover into dynamic balance  Pt will negotiate flight of stairs mod I reciprocally without increased ankle pain  Pt will ambulate community distance independent over varying surfaces/inclines without increased R ankle pain or instability over varying surfaces/inclines   Pt will increase LEFS score by at least 9 points (MCID) to indicate significant improvement in function.      Plan  x/week for  visits     Skilled therapeutic intervention to address the above mentioned physical and functional impairments and limitations including, but not limited to: patient education, therapeutic exercise, therapeutic activity, manual therapy, body mechanics training, dry needling, blood flow restriction training, instrumented soft tissue mobilization, manual soft tissue mobilization, gait retraining, biofeedback, cryotherapy, electrical stimulation, home  program development, hot pack, taping, neuromuscular re-education, self-care/home management, and vasopneumatic compression.

## 2024-10-22 ENCOUNTER — EVALUATION (OUTPATIENT)
Dept: PHYSICAL THERAPY | Facility: CLINIC | Age: 59
End: 2024-10-22
Payer: COMMERCIAL

## 2024-10-22 DIAGNOSIS — S93.412D SPRAIN OF CALCANEOFIBULAR LIGAMENT OF LEFT ANKLE, SUBSEQUENT ENCOUNTER: ICD-10-CM

## 2024-10-22 DIAGNOSIS — S93.492D SPRAIN OF ANTERIOR TALOFIBULAR LIGAMENT OF LEFT ANKLE, SUBSEQUENT ENCOUNTER: Primary | ICD-10-CM

## 2024-10-22 PROCEDURE — 97161 PT EVAL LOW COMPLEX 20 MIN: CPT | Mod: GP | Performed by: PHYSICAL THERAPIST

## 2024-10-22 ASSESSMENT — PAIN - FUNCTIONAL ASSESSMENT: PAIN_FUNCTIONAL_ASSESSMENT: 0-10

## 2024-10-22 ASSESSMENT — ENCOUNTER SYMPTOMS
LOSS OF SENSATION IN FEET: 0
DEPRESSION: 0
OCCASIONAL FEELINGS OF UNSTEADINESS: 0

## 2024-10-22 ASSESSMENT — PAIN SCALES - GENERAL: PAINLEVEL_OUTOF10: 0 - NO PAIN

## 2024-10-22 NOTE — PROGRESS NOTES
Physical Therapy  Physical Therapy Evaluation    Patient Name: Gladys Norris  MRN: [id number]  Today's Date: 10/22/2024  Time Calculation  Start Time: 0744  Stop Time: 0820  Time Calculation (min): 36 min  PT Evaluation Time Entry  PT Evaluation (Low) Time Entry: 31  PT Therapeutic Procedures Time Entry  Therapeutic Exercise Time Entry: 5                   Insurance:  COPAY 0 DED [id number])   Visit number: 1 of 9  Authorization info: NO AUTH REQ  Insurance Type: MMO SU[employer] 60V PT OT ST     General:  Reason for visit: Left Ankle Surgery  Referred by: Penny Luciano MD     Current Problem:  S93.412D Sprain of calcaneofibular ligament of left ankle, subsequent encounter  S93.492D Sprain of anterior talofibular ligament of left ankle, subsequent encounter    Precautions: Per MR AUGUSTE notes and protocol scanned into chart      Medical History Form: Reviewed (scanned into chart)    Subjective:   Left ankle arthroscopy with extensive debridement, lateral ligament reconstruction- Carlos Alberto Coles 9/25/24...Further treatment options discussed including start WBAT in boot with crutches or walker.  Instructed her on initial ROM exercises, can start riding exercise bike next week on low resistance with athletic shoe on left foot to work motion, wrote prescription for PT and gave her rehab protocol.  FUV in 4 weeks.  Continue ASA until out of the boot.  The patient's questions were answered in detail. - Encounter note 10/15/24      Current Condition:   Same    Pain:     Aggravating Factors:  Post-Exercises, Inversion  Relieving Factors:  Rest    Relevant Information (PMH & Previous Tests/Imaging):XR 7/22/24   IMPRESSION:  1.  Focal marrow edema at the undersurface of the talar head may  represent contusion versus stress reaction. No discrete fracture line  seen.  2. Remote avulsion is at the base of the 5th metatarsal.  3. Thickening of the anterior talofibular and of the calcaneofibular  ligament suggestive of a remote  injury. No acute abnormality seen      Previous Interventions/Treatments: None    Prior Level of Function (PLOF)  Patient previously independent with all ADLs  Exercise/Physical Activity: NA  Work/School: On Leave    Patients Living Environment: Reviewed and no concern    Primary Language: English    Patient's Goal(s) for Therapy: walk/run w/o AD    Red Flags: Do you have any of the following? No  Fever/chills, unexplained weight changes, dizziness/fainting, unexplained change in bowel or bladder functions, unexplained malaise or muscle weakness, night pain/sweats, numbness or tingling    Objective:    LEFT ANKLE       Observation-No overt ss of infection, post-op steri strips applied, incisions appear well healing and approximated.     Ankle Palpation/Joint Mobility Assessment     Ankle AROM  PF 30  DF 3  IN 6-some pain  EV 3    Ankle MMT 0/5  PF 4+  DF 4  IN 4-  EV 3+      Posture: WNL-accounting for leg length discrepancy d/t walking boot    Lower Extremity Functional Movements  Transfers: Ind  Gait: Ind w/ crutches, non-antalgic pattern       Outcome Measures:  Other Measures  Lower Extremity Funtional Score (LEFS): 32       EDUCATION: Pt educated in HEP, PT POC, anatomy, activity avoidance, proper positioning/posture, use of cold post exs , pt demonstrates understanding of PT info, all questions answered.        Goals: Set and discussed today  Increase ROM to WFL for R ankle  Increase Strength where deficits noted by 1/3 to 1 mm grade for R ankle  Ind w/ HEP to expedite progress and promote goal achievement for R ankle    Decrease Outcome score to for evidence of improved function for R ankle  Return to PLOF for R ankle  Decrease pain to 0/10 or less post ex session      Plan of care was developed with input and agreement by the patient.    Treatment Performed:    Therapeutic Exercise:    5 min  YTB DF/PF/IN 10x  YTB EV 5x d/t pain        Assessment: 60 y/o F presents with c/o ankle pain. Upon examination  patient demonstrates impaired L ank function limiting overall functional mobility including gait. Activity limitations and participations restrictions include work duties. Pt to benefit from outpatient PT to address deficits, maximize functional mobility and improve QOL.  The clinical presentation of this patient is stable and their history and examination findings are consistent with a low complexity evaluation with good rehab potential.          Plan:     Planned Interventions include: therapeutic exercise, self-care home management, manual therapy, therapeutic activities, gait training, neuromuscular coordination, vasopneumatic, dry needling, aquatic therapy  Frequency: 2x/wk  Duration: 4weeks      Eric Schmidt PT

## 2024-10-29 ENCOUNTER — TREATMENT (OUTPATIENT)
Dept: PHYSICAL THERAPY | Facility: CLINIC | Age: 59
End: 2024-10-29
Payer: COMMERCIAL

## 2024-10-29 DIAGNOSIS — S93.412D SPRAIN OF CALCANEOFIBULAR LIGAMENT OF LEFT ANKLE, SUBSEQUENT ENCOUNTER: ICD-10-CM

## 2024-10-29 DIAGNOSIS — S93.492D SPRAIN OF ANTERIOR TALOFIBULAR LIGAMENT OF LEFT ANKLE, SUBSEQUENT ENCOUNTER: ICD-10-CM

## 2024-10-29 DIAGNOSIS — S91.309A WOUND OF FOOT: Primary | ICD-10-CM

## 2024-10-29 PROCEDURE — 97110 THERAPEUTIC EXERCISES: CPT | Mod: GP

## 2024-10-29 RX ORDER — CEPHALEXIN 500 MG/1
500 CAPSULE ORAL EVERY 12 HOURS
Qty: 14 CAPSULE | Refills: 0 | Status: SHIPPED | OUTPATIENT
Start: 2024-10-29 | End: 2024-11-05

## 2024-10-31 ENCOUNTER — TREATMENT (OUTPATIENT)
Dept: PHYSICAL THERAPY | Facility: CLINIC | Age: 59
End: 2024-10-31
Payer: COMMERCIAL

## 2024-10-31 ENCOUNTER — LAB (OUTPATIENT)
Dept: LAB | Facility: LAB | Age: 59
End: 2024-10-31
Payer: COMMERCIAL

## 2024-10-31 DIAGNOSIS — E03.9 HYPOTHYROIDISM, UNSPECIFIED TYPE: ICD-10-CM

## 2024-10-31 DIAGNOSIS — S93.492D SPRAIN OF ANTERIOR TALOFIBULAR LIGAMENT OF LEFT ANKLE, SUBSEQUENT ENCOUNTER: Primary | ICD-10-CM

## 2024-10-31 DIAGNOSIS — S93.412D SPRAIN OF CALCANEOFIBULAR LIGAMENT OF LEFT ANKLE, SUBSEQUENT ENCOUNTER: ICD-10-CM

## 2024-10-31 LAB — TSH SERPL-ACNC: 1.35 MIU/L (ref 0.44–3.98)

## 2024-10-31 PROCEDURE — 97140 MANUAL THERAPY 1/> REGIONS: CPT | Mod: GP,CQ

## 2024-10-31 PROCEDURE — 84443 ASSAY THYROID STIM HORMONE: CPT

## 2024-10-31 PROCEDURE — 97110 THERAPEUTIC EXERCISES: CPT | Mod: GP,CQ

## 2024-10-31 PROCEDURE — 36415 COLL VENOUS BLD VENIPUNCTURE: CPT

## 2024-10-31 ASSESSMENT — PAIN - FUNCTIONAL ASSESSMENT: PAIN_FUNCTIONAL_ASSESSMENT: 0-10

## 2024-10-31 ASSESSMENT — PAIN SCALES - GENERAL: PAINLEVEL_OUTOF10: 2

## 2024-10-31 ASSESSMENT — PAIN DESCRIPTION - DESCRIPTORS: DESCRIPTORS: TENDER

## 2024-11-03 ENCOUNTER — HOSPITAL ENCOUNTER (OUTPATIENT)
Dept: RADIOLOGY | Facility: HOSPITAL | Age: 59
Discharge: HOME | End: 2024-11-03
Payer: COMMERCIAL

## 2024-11-03 DIAGNOSIS — E78.2 MIXED HYPERLIPIDEMIA: ICD-10-CM

## 2024-11-03 PROCEDURE — 75571 CT HRT W/O DYE W/CA TEST: CPT

## 2024-11-05 ENCOUNTER — TREATMENT (OUTPATIENT)
Dept: PHYSICAL THERAPY | Facility: CLINIC | Age: 59
End: 2024-11-05
Payer: COMMERCIAL

## 2024-11-05 DIAGNOSIS — N95.1 MENOPAUSAL SYMPTOM: Primary | ICD-10-CM

## 2024-11-05 DIAGNOSIS — S93.492D SPRAIN OF ANTERIOR TALOFIBULAR LIGAMENT OF LEFT ANKLE, SUBSEQUENT ENCOUNTER: ICD-10-CM

## 2024-11-05 DIAGNOSIS — S93.412D SPRAIN OF CALCANEOFIBULAR LIGAMENT OF LEFT ANKLE, SUBSEQUENT ENCOUNTER: ICD-10-CM

## 2024-11-05 PROCEDURE — 97140 MANUAL THERAPY 1/> REGIONS: CPT | Mod: GP

## 2024-11-05 PROCEDURE — 97110 THERAPEUTIC EXERCISES: CPT | Mod: GP

## 2024-11-05 RX ORDER — FLUCONAZOLE 150 MG/1
150 TABLET ORAL ONCE
Qty: 1 TABLET | Refills: 0 | Status: SHIPPED | OUTPATIENT
Start: 2024-11-05 | End: 2024-11-05

## 2024-11-05 NOTE — PROGRESS NOTES
Physical Therapy Treatment    Patient Name: Gladys Norris  MRN: [id number]  Today's Date: 11/5/2024  Time Calculation  Start Time: 0703  Stop Time: 0741  Time Calculation (min): 38 min     PT Therapeutic Procedures Time Entry  Manual Therapy Time Entry: 8  Therapeutic Exercise Time Entry: 30         (FROM MD NOTE 10/14 - Dr. Luciano)   PLAN: Further treatment options discussed including start WBAT in boot with crutches or walker.  Instructed her on initial ROM exercises, can start riding exercise bike next week on low resistance with athletic shoe on left foot to work motion, wrote prescription for PT and gave her rehab protocol.  FUV in 4 weeks.  Continue ASA until out of the boot.  The patient's questions were answered in detail.      Insurance:   COPAY 0 DED [id number]   Visit number: 4 of 9  Authorization info: NO AUTH REQ  Insurance Type: MMO SUPER MED 60V PT OT ST      General:  Reason for visit: Left Ankle Surgery (Left ankle arthroscopy with extensive debridement, lateral ligament reconstruction- Brostrum Coles 9/25/24 )  Referred by: Penny Luciano MD   Assessment:   Pt tolerated treatment session well today. Continued to focus on improving ROM this visit. Able to add toe yoga for intrinsic strengthening which was challenging for pt. ROM much improved this date vs previous visit with this therapist. Incision healing well with no signs of infection, erythema much improved. Pt will continue to benefit from skilled therapy in order to improve functional mobility.    Plan:   Continue to progress as tolerated with focus on ROM per protocol. MD fu 11/12 (Dr. Luciano).  Add strengthening per protocol    Current Problem  1. Sprain of anterior talofibular ligament of left ankle, subsequent encounter  Follow Up In Physical Therapy      2. Sprain of calcaneofibular ligament of left ankle, subsequent encounter  Follow Up In Physical Therapy            Subjective   General   Pt states she has no pain today. She  "states she took the steri strips off and the incision looks much better.   Precautions    See protocol    Pain   1-2/10    Objective   Incisions healing well, no steri strips this visit    Treatments:  Therapeutic Exercise (76071):   Toe scrunches x20  Arch doming x20 Reviewed  Ankle Alphabet x1  Wipers x20  Seated Heel Slide x15  SLR 2x10 resume NV  S/L Hip abd 2x10 resume NV  Clamshells RTB 2x10 - resume NV  Seated Prostretch (DF/PF) 10\" x 2 min  Rock 4 way (seated) x15 ea  Toe Yoga x10  Standing hip ext x15 L only     Manual (57543):  PROM L ankle PROM - 8 min   EDUCATION:  Pt educated on not overdoing it, precautions      Access Code: [email or web address]  URL: [email or web address]  Date: 11/05/2024  Prepared by: Donya Hardy    Exercises  - Ankle Inversion Eversion Towel Slide  - 1 x daily - 7 x weekly - 2 sets - 10 reps  "

## 2024-11-07 ENCOUNTER — TREATMENT (OUTPATIENT)
Dept: PHYSICAL THERAPY | Facility: CLINIC | Age: 59
End: 2024-11-07
Payer: COMMERCIAL

## 2024-11-07 DIAGNOSIS — S93.412D SPRAIN OF CALCANEOFIBULAR LIGAMENT OF LEFT ANKLE, SUBSEQUENT ENCOUNTER: ICD-10-CM

## 2024-11-07 DIAGNOSIS — S93.492D SPRAIN OF ANTERIOR TALOFIBULAR LIGAMENT OF LEFT ANKLE, SUBSEQUENT ENCOUNTER: ICD-10-CM

## 2024-11-07 PROCEDURE — 97110 THERAPEUTIC EXERCISES: CPT | Mod: GP,CQ

## 2024-11-07 PROCEDURE — 97140 MANUAL THERAPY 1/> REGIONS: CPT | Mod: GP,CQ

## 2024-11-07 ASSESSMENT — PAIN SCALES - GENERAL: PAINLEVEL_OUTOF10: 1

## 2024-11-07 ASSESSMENT — PAIN DESCRIPTION - DESCRIPTORS: DESCRIPTORS: TIGHTNESS

## 2024-11-07 ASSESSMENT — PAIN - FUNCTIONAL ASSESSMENT: PAIN_FUNCTIONAL_ASSESSMENT: 0-10

## 2024-11-07 NOTE — PROGRESS NOTES
Physical Therapy Treatment    Patient Name: Gladys Norris  MRN: [id number]  Today's Date: 11/7/2024  Time Calculation  Start Time: 0702  Stop Time: 0745  Time Calculation (min): 43 min     PT Therapeutic Procedures Time Entry  Manual Therapy Time Entry: 10  Therapeutic Exercise Time Entry: 33          (FROM MD NOTE 10/14 - Dr. Luciano)   PLAN: Further treatment options discussed including start WBAT in boot with crutches or walker.  Instructed her on initial ROM exercises, can start riding exercise bike next week on low resistance with athletic shoe on left foot to work motion, wrote prescription for PT and gave her rehab protocol.  FUV in 4 weeks.  Continue ASA until out of the boot.  The patient's questions were answered in detail.         Insurance:   COPAY 0 DED [id number]   Visit number: 5 of 9  Authorization info: NO AUTH REQ  Insurance Type: MMO SUPER MED 60V PT OT ST     General:  Reason for visit: Left Ankle Surgery (Left ankle arthroscopy with extensive debridement, lateral ligament reconstruction- Carlos Alberto Coles 9/25/24 )  Referred by: Penny Luciano MD   Assessment:   Progressed pt's program today w/ addition of ankle tband 4 way. Pt able to complete w/ good control and mobility. Some discomfort w/ inversion motion both w/ this ex and w/ manual/PROM. Resumed LLE strengthening ex's today w/ pt demo'ing overall good tolerance to. Mod challenge observed w/ performing toe yoga dt weakness through L foot. She is making nice progress at this time and will cont to benefit from skilled PT to address her deficits and improve her overall functional ability.   Plan:    Continue to progress as tolerated with focus on ROM per protocol. MD fu 11/12 (Dr. Luciano).  Add strengthening per protocol.    Current Problem  1. Sprain of anterior talofibular ligament of left ankle, subsequent encounter  Follow Up In Physical Therapy      2. Sprain of calcaneofibular ligament of left ankle, subsequent encounter  Follow Up  "In Physical Therapy          Subjective   General  Pt states the stiffness/tightness has been better. Barely any discomfort. \"If I think about it it's there.\" 1/10 upon coming in for appt.   Precautions       Pain  Pain Assessment: 0-10  0-10 (Numeric) Pain Score: 1  Pain Location: Ankle  Pain Orientation: Left  Pain Descriptors: Tightness    Objective       Treatments:  Therapeutic Exercise (71724):  PONCHO 5 min (shoe on)   SLR x20  S/L Hip abd x20  Clamshells GTB 2x10   Seated Prostretch (DF/PF) 10\" x 2 min  Rock 4 way (seated) x10 ea  Toe Yoga x10  Standing hip ext, abd x15 L only  TB Ankle 4 way RTB x10ea    Not Performed Today:   Toe scrunches x20  Wipers x20   Seated Heel Slide x15  Arch doming x20    Ankle Alphabet x1     Manual (98994):  PROM L ankle PROM - 10 min   EDUCATION:  Outpatient Education  Education Provided: Home Exercise Program  Equipment: Thera-Band (Red & Green)      "

## 2024-11-11 DIAGNOSIS — M77.9 TENDONITIS: ICD-10-CM

## 2024-11-11 RX ORDER — CYCLOBENZAPRINE HCL 10 MG
10 TABLET ORAL NIGHTLY PRN
Qty: 30 TABLET | Refills: 1 | Status: SHIPPED | OUTPATIENT
Start: 2024-11-11

## 2024-11-12 ENCOUNTER — APPOINTMENT (OUTPATIENT)
Dept: PHYSICAL THERAPY | Facility: CLINIC | Age: 59
End: 2024-11-12
Payer: COMMERCIAL

## 2024-11-12 ENCOUNTER — OFFICE VISIT (OUTPATIENT)
Dept: ORTHOPEDIC SURGERY | Facility: CLINIC | Age: 59
End: 2024-11-12
Payer: COMMERCIAL

## 2024-11-12 DIAGNOSIS — M25.372 ANKLE INSTABILITY, LEFT: Primary | ICD-10-CM

## 2024-11-12 DIAGNOSIS — S93.492D SPRAIN OF ANTERIOR TALOFIBULAR LIGAMENT OF LEFT ANKLE, SUBSEQUENT ENCOUNTER: Primary | ICD-10-CM

## 2024-11-12 DIAGNOSIS — S93.412D SPRAIN OF CALCANEOFIBULAR LIGAMENT OF LEFT ANKLE, SUBSEQUENT ENCOUNTER: ICD-10-CM

## 2024-11-12 PROCEDURE — 99211 OFF/OP EST MAY X REQ PHY/QHP: CPT | Performed by: ORTHOPAEDIC SURGERY

## 2024-11-12 NOTE — PROGRESS NOTES
Patient comes in s/p Left ankle arthroscopy with extensive debridement, lateral ligament reconstruction- Carlos Alberto Coles - Left   She is doing well, in PT.  Swelling improving but using compression stockings.      Physical Exam:  Left ankle : incisions healed one scab left laterally, calf soft and supple, toes pink and warm with good capillary refill, pulses intact, limb swelling appropriate, wiggles toes, good ROM of ankle and ST joint, motor strength intact, calf tone improving    Assessment: Left ankle arthroscopy with extensive debridement, lateral ligament reconstruction- Carlos Alberto Coles 9/25/24  Plan:  - WBAT, can transition into ankle lace-up brace  - DVT prophylaxis continue until out of boot  - PT orders updated  - off of work since her workplace not able to adjust or limit her activities  - fuv in 6 weeks or so  - increase stationary bike use for cardio outside of PT  Patient's questions were answered in detail and they are agreeable to above plan.

## 2024-11-14 ENCOUNTER — TREATMENT (OUTPATIENT)
Dept: PHYSICAL THERAPY | Facility: CLINIC | Age: 59
End: 2024-11-14
Payer: COMMERCIAL

## 2024-11-14 DIAGNOSIS — S93.412D SPRAIN OF CALCANEOFIBULAR LIGAMENT OF LEFT ANKLE, SUBSEQUENT ENCOUNTER: ICD-10-CM

## 2024-11-14 DIAGNOSIS — S93.492D SPRAIN OF ANTERIOR TALOFIBULAR LIGAMENT OF LEFT ANKLE, SUBSEQUENT ENCOUNTER: ICD-10-CM

## 2024-11-14 PROCEDURE — 97140 MANUAL THERAPY 1/> REGIONS: CPT | Mod: GP,CQ

## 2024-11-14 PROCEDURE — 97110 THERAPEUTIC EXERCISES: CPT | Mod: GP,CQ

## 2024-11-14 NOTE — PROGRESS NOTES
Physical Therapy Treatment    Patient Name: Gladys Norris  MRN: [id number]  Today's Date: 11/14/2024  Time Calculation  Start Time: 0700  Stop Time: 0748  Time Calculation (min): 48 min     PT Therapeutic Procedures Time Entry  Manual Therapy Time Entry: 8  Therapeutic Exercise Time Entry: 40                 Insurance:   COPAY 0 DED [id number])   Visit number: 6 of 9  Authorization info: NO AUTH REQ  Insurance Type: MMO SUPER MED 60V PT OT ST   Assessment:  Pt came in to clinic today following fu w/ MD on Tue. Progressed her program as per MD protocol w/ addition of Stdg Hip Ext and Abd Bilat (vs on L only as in prev visits), TR/HR and Rock Partial WB to facilitate improved mobility. Pt also performed Ankle TB w/ Green today, overall good f/t.   Pt brought lace up brace w/ her - instructed her on proper application and wear as MD wants her to start wearing 1 hour a day and work her way up to full day. If needed, pt encouraged to use boot for comfort. Pt wearing compression socks to help w/ circulation and swelling. She is making nice progress at this time and will cont to benefit from skilled PT to address her deficits.   Plan:    Continue to progress as tolerated with focus on ROM per protocol.    Current Problem  1. Sprain of anterior talofibular ligament of left ankle, subsequent encounter  Follow Up In Physical Therapy      2. Sprain of calcaneofibular ligament of left ankle, subsequent encounter  Follow Up In Physical Therapy          Subjective   General   Pt saw MD Shafer 11/12 for fu and was released to start weaning out of boot. Pt given new instructions for rehab. Pt states the MD was pleased w/ her progress so far.   Precautions   11/12: WBAT, Wean from boot in to ankle lace up brace, hip/core/lower leg/intrinsic strengthening and balance    Pain       Objective   11/14 AROM L ankle  DF 5*  PF 51*  Inv 30*  EV 5*     Treatments:  Therapeutic Exercise (79294):   PONCHO 5 min (shoe on)   SLR x20  S/L Hip abd  "x20  Clamshells GTB 2x10   Rock 4 way (PWB) x10 ea  Standing hip ext, abd x15 B  TB Ankle 4 way GTB x10ea  TR/HR x10  Sidestepping 1 lap  LAQ 3# x20    Not Performed Today:   Toe scrunches x20  Wipers x20   Seated Heel Slide x15  Arch doming x20    Ankle Alphabet x1  Toe Yoga x10  Seated Prostretch (DF/PF) 10\" x 2 min     Manual (40525):  PROM L ankle PROM - 8 min   EDUCATION:     Access Code: [email or web address]  URL: [email or web address]  Date: 11/14/2024  Prepared by: Cameron Martinez    Exercises  - Standing Toe Raises at Chair  - 1 x daily - 5-6 x weekly - 1 sets - 15 reps  - Standing Heel Raise with Support  - 1 x daily - 5-6 x weekly - 1 sets - 15 reps  - Sidestepping  - 1 x daily - 5-6 x weekly - 1 sets - 10 reps    "

## 2024-11-14 NOTE — PROGRESS NOTES
Physical Therapy Treatment/Recheck    Patient Name: Gladys Norris  MRN: [id number]  Today's Date: 11/19/2024  Time Calculation  Start Time: 0738  Stop Time: 0820  Time Calculation (min): 42 min     PT Therapeutic Procedures Time Entry  Therapeutic Exercise Time Entry: 42                 Insurance:   COPAY 0 DED [id number])   Visit number: 7 of 9  Authorization info: NO AUTH REQ  Insurance Type: MMO SUPER MED 60V PT OT ST     Current Problem  1. Sprain of anterior talofibular ligament of left ankle, subsequent encounter  Follow Up In Physical Therapy      2. Sprain of calcaneofibular ligament of left ankle, subsequent encounter  Follow Up In Physical Therapy            Subjective     General   Pt notes unable to go 4hrs w/ ank  brace d/t pain especially at base of 5th metatarsal.  Pain also w/ INV of ankle at end range w/o brace on.  Precautions   11/12: WBAT, Wean from boot in to ankle lace up brace, hip/core/lower leg/intrinsic strengthening and balance    Pain  Pain Assessment: 0-10  0-10 (Numeric) Pain Score: 4  Pain Location: Ankle  Pain Orientation: Left  Pain Descriptors: Aching  Pain Frequency: Intermittent    Objective   Other Measures  Lower Extremity Funtional Score (LEFS): 46    Discussed boot modified boot schedule to avoid getting into pain.  Pt to wear brace 2 hrs then place boot on for 4 then try 2hrs w w/ brace and replace again after.    11/19 AROM L ankle    Ankle AROM  PF 50  DF 5  IN 20  EV 5    Ankle MMT 0/5  PF 5  DF 4  IN 4  EV 3+      Treatments:  Therapeutic Exercise (86298):  ISO DF/PF/IN/EV 10x10s ea  Standing wt shifts L<>R 20x   Standing Marches 20x  SLS 5x10s    Resume as appropriate next visit:   PONCHO 6 min (shoe on)   SLR x20  S/L Hip abd x20  Clamshells GTB 2x10   Rock 4 way (PWB) x10 ea  Standing hip ext, abd x15 B  TB Ankle 4 way GTB x10ea  TR/HR x10  Sidestepping 1 lap  LAQ 3# x20        Not Performed Today:   Toe scrunches x20  Wipers x20   Seated Heel Slide x15  Arch doming x20     "Ankle Alphabet x1  Toe Yoga x10  Seated Prostretch (DF/PF) 10\" x 2 min     Manual (40774):  PROM L ankle PROM - 8 min   EDUCATION:     Access Code: [email or web address]  URL: [email or web address]  Date: 11/14/2024  Prepared by: Cameron Martinez    Exercises  - Standing Toe Raises at Chair  - 1 x daily - 5-6 x weekly - 1 sets - 15 reps  - Standing Heel Raise with Support  - 1 x daily - 5-6 x weekly - 1 sets - 15 reps  - Sidestepping  - 1 x daily - 5-6 x weekly - 1 sets - 10 reps      Goals: Set and discussed today  Increase ROM to WFL for R ankle-NM  Increase Strength where deficits noted by 1/3 to 1 mm grade for R ankle-NM  Ind w/ HEP to expedite progress and promote goal achievement for R ankle - MET  Decrease Outcome score to for evidence of improved function for R ankle-ongoing  Return to PLOF for R ankle-NM  Decrease pain to 0/10 or less post ex session-NM    Assessment:  Pt appears to be making gains toward goals.  Pt able to tolerate tx session well this date w/ no adverse effects noted from tx.  Pt compliant w/ program and would benefit from continued therapy.  Still requires skilled therapy for increasing strength/ROM for performing ADLs.      Plan:  D/t noted impairments and dysfunction of  L ank, PT will cont to address deficits of strength and ROM via therapeutic exs and modalities PRN and further assist w/ pain reduction. Cont PT 2x/wk for 4 weeks        "

## 2024-11-19 ENCOUNTER — TREATMENT (OUTPATIENT)
Dept: PHYSICAL THERAPY | Facility: CLINIC | Age: 59
End: 2024-11-19
Payer: COMMERCIAL

## 2024-11-19 DIAGNOSIS — S93.412D SPRAIN OF CALCANEOFIBULAR LIGAMENT OF LEFT ANKLE, SUBSEQUENT ENCOUNTER: ICD-10-CM

## 2024-11-19 DIAGNOSIS — S93.492D SPRAIN OF ANTERIOR TALOFIBULAR LIGAMENT OF LEFT ANKLE, SUBSEQUENT ENCOUNTER: ICD-10-CM

## 2024-11-19 PROCEDURE — 97110 THERAPEUTIC EXERCISES: CPT | Mod: GP | Performed by: PHYSICAL THERAPIST

## 2024-11-19 ASSESSMENT — PAIN - FUNCTIONAL ASSESSMENT: PAIN_FUNCTIONAL_ASSESSMENT: 0-10

## 2024-11-19 ASSESSMENT — PAIN DESCRIPTION - DESCRIPTORS: DESCRIPTORS: ACHING

## 2024-11-19 ASSESSMENT — PAIN SCALES - GENERAL: PAINLEVEL_OUTOF10: 4

## 2024-11-19 NOTE — PROGRESS NOTES
"Physical Therapy Treatment    Patient Name: Gladys Norris  MRN: [id number]  Today's Date: 11/22/2024  Time Calculation  Start Time: 0702  Stop Time: 0746  Time Calculation (min): 44 min     PT Therapeutic Procedures Time Entry  Manual Therapy Time Entry: 8  Therapeutic Exercise Time Entry: 36                 Insurance:   COPAY 0 DED [id number])   Visit number: 1/8  Authorization info: NO AUTH REQ  Insurance Type: MMO SUPER MED 60V PT OT ST     Current Problem  1. Sprain of anterior talofibular ligament of left ankle, subsequent encounter  Follow Up In Physical Therapy      2. Sprain of calcaneofibular ligament of left ankle, subsequent encounter  Follow Up In Physical Therapy              Subjective     General   Pt notes some mm spasms in foot, randomly occurring.   Precautions   11/12: WBAT, Wean from boot in to ankle lace up brace, hip/core/lower leg/intrinsic strengthening and balance    Pain  Pain Assessment: 0-10  0-10 (Numeric) Pain Score: 3  Pain Location: Ankle  Pain Orientation: Left  Pain Descriptors: Aching  Pain Frequency: Intermittent    Objective          Treatments:  Therapeutic Exercise (81184):  PONCHO 6 min (no boot)  ISO DF/PF/IN/EV 10x10s ea  Standing wt shifts L<>R 20x   Standing Marches 20x  SLS 10x5s  Wall mini Squats 2x10  Towel Scrunches 20x  Toe Extension 20x    Resume as appropriate :   SLR x20  S/L Hip abd x20  Clamshells GTB 2x10   Rock 4 way (PWB) x10 ea  Standing hip ext, abd x15 B  TB Ankle 4 way GTB x10ea  TR/HR x10  Sidestepping 1 lap  LAQ 3# x20      Not Performed Today:  Wipers x20   Seated Heel Slide x15  Arch doming x20   Ankle Alphabet x1  Toe Yoga x10  Seated Prostretch (DF/PF) 10\" x 2 min     Manual (27931):  PROM L ankle STM/PROM - 8 min   EDUCATION:     Access Code: [email or web address]  URL: [email or web address]  Date: 11/14/2024  Prepared by: Cameron Martinez    Exercises  - Standing Toe Raises at Chair  - 1 x daily - 5-6 x weekly - 1 sets - 15 reps  - Standing Heel " Raise with Support  - 1 x daily - 5-6 x weekly - 1 sets - 15 reps  - Sidestepping  - 1 x daily - 5-6 x weekly - 1 sets - 10 reps    Assessment:  Is making appropriate gains, protocol being followed.  Pt ready to advance w/ exs. Pt appears to be making gains toward goals.  Pt able to tolerate tx session well this date w/ no adverse effects noted from tx.  Pt compliant w/ program and would benefit from continued therapy.  Still requires skilled therapy for increasing strength/ROM for performing ADLs.     Plan:  D/t noted impairments and dysfunction of  L ank, PT will cont to address deficits of strength and ROM via therapeutic exs and modalities PRN and further assist w/ pain reduction. Cont PT 2x/wk for 4 weeks

## 2024-11-22 ENCOUNTER — APPOINTMENT (OUTPATIENT)
Dept: PHYSICAL THERAPY | Facility: CLINIC | Age: 59
End: 2024-11-22
Payer: COMMERCIAL

## 2024-11-22 DIAGNOSIS — S93.412D SPRAIN OF CALCANEOFIBULAR LIGAMENT OF LEFT ANKLE, SUBSEQUENT ENCOUNTER: ICD-10-CM

## 2024-11-22 DIAGNOSIS — S93.492D SPRAIN OF ANTERIOR TALOFIBULAR LIGAMENT OF LEFT ANKLE, SUBSEQUENT ENCOUNTER: ICD-10-CM

## 2024-11-22 PROCEDURE — 97110 THERAPEUTIC EXERCISES: CPT | Mod: GP | Performed by: PHYSICAL THERAPIST

## 2024-11-22 PROCEDURE — 97140 MANUAL THERAPY 1/> REGIONS: CPT | Mod: GP | Performed by: PHYSICAL THERAPIST

## 2024-11-22 ASSESSMENT — PAIN SCALES - GENERAL: PAINLEVEL_OUTOF10: 3

## 2024-11-22 ASSESSMENT — PAIN - FUNCTIONAL ASSESSMENT: PAIN_FUNCTIONAL_ASSESSMENT: 0-10

## 2024-11-22 ASSESSMENT — PAIN DESCRIPTION - DESCRIPTORS: DESCRIPTORS: ACHING

## 2024-12-02 NOTE — PROGRESS NOTES
"Physical Therapy Treatment    Patient Name: Gladys Norris  MRN: [id number]  Today's Date: 12/3/2024  Time Calculation  Start Time: 0701  Stop Time: 0743  Time Calculation (min): 42 min     PT Therapeutic Procedures Time Entry  Manual Therapy Time Entry: 8  Neuromuscular Re-Education Time Entry: 10  Therapeutic Exercise Time Entry: 22                 Current Problem  1. Sprain of anterior talofibular ligament of left ankle, subsequent encounter  Follow Up In Physical Therapy      2. Sprain of calcaneofibular ligament of left ankle, subsequent encounter  Follow Up In Physical Therapy          Insurance:  COPAY 0 DED [id number])   Visit number: 2/8  Authorization info: NO AUTH REQ  Insurance Type: MMO SUPER MED 60V PT OT ST      Precautions    11/12: WBAT, Wean from boot in to ankle lace up brace, hip/core/lower leg/intrinsic strengthening and balance       Subjective   Subjective:   Pt was able to wear the ankle brace 2 days in a row, but then went back into the boot.   Pain   3/10    Objective   Treatments:  Therapeutic Exercise (34856):  PONCHO 6 min (no boot)---  ISO DF/PF/IN/EV 10x10s ea  Toe Yoga x10  Standing wt shifts L<>R 20x ---  Standing Marches 20x  SLS 5x5s  Wall mini Squats 2x10  Towel Scrunches 20x  Standing hip abd/ext B Rtb 10x2   Sidestepping 5 steps x 3 laps  Step ups F/L 4 in. 10x ea  Tandem B 20\"x 1 ea    Resume as appropriate :   SLR x20  S/L Hip abd x20  Clamshells GTB 2x10   Rock 4 way (PWB) x10 ea  Standing hip ext, abd x15 B  TB Ankle 4 way GTB x10ea  TR/HR x10  LAQ 3# x20   Toe Extension 20x     Not Performed Today:  Wipers x20   Seated Heel Slide x15  Arch doming x20   Ankle Alphabet x1    Seated Prostretch (DF/PF) 10\" x 2 min      Manual (99695):  PROM L ankle STM/PROM - 8 min   OP EDUCATION:   tandem      Assessment:   Pt displayed weakness with IV/EV, however, better strength in sagital plane. Added TB to standing hip and sidestepping ex. Pt felt mostly hip soreness with these. Introduced step " ups, with some circumducting of hip. Pt SLB seems to be gradually improving. Pt challenged to maintain stability with tandem, and had some lateral ankle pain. Good overall tolerance to therapy session    Plan:   Cont to increase L LE strength and ankle stability

## 2024-12-03 ENCOUNTER — TREATMENT (OUTPATIENT)
Dept: PHYSICAL THERAPY | Facility: CLINIC | Age: 59
End: 2024-12-03
Payer: COMMERCIAL

## 2024-12-03 DIAGNOSIS — S93.412D SPRAIN OF CALCANEOFIBULAR LIGAMENT OF LEFT ANKLE, SUBSEQUENT ENCOUNTER: ICD-10-CM

## 2024-12-03 DIAGNOSIS — S93.492D SPRAIN OF ANTERIOR TALOFIBULAR LIGAMENT OF LEFT ANKLE, SUBSEQUENT ENCOUNTER: Primary | ICD-10-CM

## 2024-12-03 PROCEDURE — 97140 MANUAL THERAPY 1/> REGIONS: CPT | Mod: GP,CQ

## 2024-12-03 PROCEDURE — 97110 THERAPEUTIC EXERCISES: CPT | Mod: GP,CQ

## 2024-12-03 PROCEDURE — 97112 NEUROMUSCULAR REEDUCATION: CPT | Mod: GP,CQ

## 2024-12-04 NOTE — PROGRESS NOTES
"Physical Therapy Treatment    Patient Name: Gladys Norris  MRN: [id number]  Today's Date: 12/4/2024                          Current Problem  No diagnosis found.      Insurance:  COPAY 0 DED [id number]   Visit number: 2/8  Authorization info: NO AUTH REQ  Insurance Type: [employer]V PT OT ST      Precautions    11/12: WBAT, Wean from boot in to ankle lace up brace, hip/core/lower leg/intrinsic strengthening and balance       Subjective   Subjective:   Pt was able to wear the ankle brace 2 days in a row, but then went back into the boot.   Pain   3/10    Objective   Treatments:  Therapeutic Exercise (71485):  PONCHO 6 min (no boot)---  ISO DF/PF/IN/EV 10x10s ea  Toe Yoga x10  Standing wt shifts L<>R 20x ---  Standing Marches 20x  SLS 5x5s  Wall mini Squats 2x10  Towel Scrunches 20x  Standing hip abd/ext B Rtb 10x2   Sidestepping 5 steps x 3 laps  Step ups F/L 4 in. 10x ea  Tandem B 20\"x 1 ea    Resume as appropriate :   SLR x20  S/L Hip abd x20  Clamshells GTB 2x10   Rock 4 way (PWB) x10 ea  Standing hip ext, abd x15 B  TB Ankle 4 way GTB x10ea  TR/HR x10  LAQ 3# x20   Toe Extension 20x     Not Performed Today:  Wipers x20   Seated Heel Slide x15  Arch doming x20   Ankle Alphabet x1    Seated Prostretch (DF/PF) 10\" x 2 min      Manual (80458):  PROM L ankle STM/PROM - 8 min   OP EDUCATION:   tandem      Assessment:   Pt displayed weakness with IV/EV, however, better strength in sagital plane. Added TB to standing hip and sidestepping ex. Pt felt mostly hip soreness with these. Introduced step ups, with some circumducting of hip. Pt SLB seems to be gradually improving. Pt challenged to maintain stability with tandem, and had some lateral ankle pain. Good overall tolerance to therapy session    Plan:   Cont to increase L LE strength and ankle stability               "

## 2024-12-06 ENCOUNTER — APPOINTMENT (OUTPATIENT)
Dept: PHYSICAL THERAPY | Facility: CLINIC | Age: 59
End: 2024-12-06
Payer: COMMERCIAL

## 2024-12-09 NOTE — PROGRESS NOTES
"Physical Therapy Treatment    Patient Name: Gladys Norris  MRN: [id number]  Today's Date: 12/10/2024  Time Calculation  Start Time: 0703  Stop Time: 0746  Time Calculation (min): 43 min     PT Therapeutic Procedures Time Entry  Manual Therapy Time Entry: 10  Neuromuscular Re-Education Time Entry: 10  Therapeutic Exercise Time Entry: 21                 Current Problem  1. Sprain of anterior talofibular ligament of left ankle, subsequent encounter  Follow Up In Physical Therapy      2. Sprain of calcaneofibular ligament of left ankle, subsequent encounter  Follow Up In Physical Therapy          Insurance:  COPAY 0 DED [id number])   Visit number: 3/8  Authorization info: NO AUTH REQ  Insurance Type: [employer] 60V PT OT ST      Precautions   11/12: WBAT, Wean from boot in to ankle lace up brace, hip/core/lower leg/intrinsic strengthening and balance          Subjective   Subjective:   Pt reports that mornings are great, but afternoons and evenings get worse. She hasn't worn her boot in the past week.   Pain   2/10    Objective   Treatments:  Therapeutic Exercise (57523):  PONCHO 6 min (no boot)---  Seated HR/TR 20x  Rock 4 way (PWB) x10 ea  Prostretch gastroc strech 10\"x10  Standing hip abd/ext B Rtb 10x2  Mini squats 15x  Step ups F/L 4 in. 10x ea  Standing Marches on black [place] disc 20x  L lunge w/ anterior pressure from therapist for DF 10x     Manual ([id number]):  PROM L ankle STM/PROM , STM to R ITB/glute- 10 min      Resume as appropriate :  Sidestepping 5 steps x 3 laps----  Toe Yoga x10---  Standing wt shifts L<>R 20x ---  SLS 5x5s----  Wall mini Squats 2x10---  Towel Scrunches 20x---  Tandem B 20\"x 1 ea---    SLR x20  S/L Hip abd x20  Clamshells GTB 2x10   TB Ankle 4 way GTB x10ea  LAQ 3# x20   Toe Extension 20x   ISO DF/PF/IN/EV 10x10s ea    Not Performed Today:  Wipers x20   Seated Heel Slide x15  Arch doming x20   Ankle Alphabet x1            OP EDUCATION:   Avoid exercises that cause R hip " pain      Assessment:   Pt had some pain in the ankle and foot during PROM. Pt did fine with the rock and prostretch in standing position. Pt experienced R hip pain during standing 2-way hip. Had to use 1 UE with marching. Added lunges with anterior over pressure, as pt c/o incline/declined walking, bothering her. Fair tolerance to therapy session    Plan:   Cont to progress with L ankle strengthening, while avoiding increased R hip pain.

## 2024-12-10 ENCOUNTER — TREATMENT (OUTPATIENT)
Dept: PHYSICAL THERAPY | Facility: CLINIC | Age: 59
End: 2024-12-10
Payer: COMMERCIAL

## 2024-12-10 DIAGNOSIS — S93.412D SPRAIN OF CALCANEOFIBULAR LIGAMENT OF LEFT ANKLE, SUBSEQUENT ENCOUNTER: ICD-10-CM

## 2024-12-10 DIAGNOSIS — S93.492D SPRAIN OF ANTERIOR TALOFIBULAR LIGAMENT OF LEFT ANKLE, SUBSEQUENT ENCOUNTER: Primary | ICD-10-CM

## 2024-12-10 PROCEDURE — 97140 MANUAL THERAPY 1/> REGIONS: CPT | Mod: GP,CQ

## 2024-12-10 PROCEDURE — 97112 NEUROMUSCULAR REEDUCATION: CPT | Mod: GP,CQ

## 2024-12-10 PROCEDURE — 97110 THERAPEUTIC EXERCISES: CPT | Mod: GP,CQ

## 2024-12-10 NOTE — PROGRESS NOTES
"Physical Therapy Treatment    Patient Name: Gladys Norris  MRN: [id number]  Today's Date: 12/13/2024  Time Calculation  Start Time: 0659  Stop Time: 0743  Time Calculation (min): 44 min     PT Therapeutic Procedures Time Entry  Manual Therapy Time Entry: 15  Therapeutic Exercise Time Entry: 24                 Current Problem  1. Sprain of anterior talofibular ligament of left ankle, subsequent encounter  Follow Up In Physical Therapy      2. Sprain of calcaneofibular ligament of left ankle, subsequent encounter  Follow Up In Physical Therapy            Insurance:  COPAY 0 DED [id number])   Visit number: 4/8  Authorization info: NO AUTH REQ  Insurance Type: [employer] 60V PT OT ST      Precautions   11/12: WBAT, Wean from boot in to ankle lace up brace, hip/core/lower leg/intrinsic strengthening and balance          Subjective   Subjective:   Pt reports that this morning she had more pain  Pain  Pain Assessment: 0-10  0-10 (Numeric) Pain Score: 4  Pain Location: Ankle  Pain Orientation: Left  Pain Descriptors: Aching  Pain Frequency: Intermittent    Objective   Treatments:  Therapeutic Exercise (95478):  PONCHO 5 min     NT---  Seated HR/TR 20x  Rock 4 way (PWB) x10 ea  Prostretch gastroc strech 10\"x10  Standing hip abd/ext B Rtb 10x2  Mini squats 15x  Step ups F/L 4 in. 10x ea  Standing Marches on black [place] disc 20x  L lunge w/ anterior pressure from therapist for DF 10x     Manual ([id number]):  PROM L ankle STM/PROM , STM to L ankle     Resume as appropriate :  Sidestepping 5 steps x 3 laps----  Toe Yoga x10---  Standing wt shifts L<>R 20x ---  SLS 5x5s----  Wall mini Squats 2x10---  Towel Scrunches 20x---  Tandem B 20\"x 1 ea---    SLR x20  S/L Hip abd x20  Clamshells GTB 2x10   TB Ankle 4 way GTB x10ea  LAQ 3# x20   Toe Extension 20x   ISO DF/PF/IN/EV 10x10s ea    Not Performed Today:  Wipers x20   Seated Heel Slide x15  Arch doming x20   Ankle Alphabet x1            OP EDUCATION:   Extensive ED on proper exs and " stretching to promote improved function and decreased pain.  Also, advised pt to get shoes w/ wider toe box and full length insoles     Assessment:  Pt able to tolerate tx session well this date w/ no adverse effects noted from tx.  Pt compliant w/ program and appears to understand rationale for therapy.  Still requires skilled therapy for increasing strength/ROM for performing ADLs.      Plan:  D/t noted impairments and dysfunction of  L ankle, PT will cont to address deficits of strength and ROM via therapeutic exs and modalities PRN and further assist w/ pain reduction.

## 2024-12-13 ENCOUNTER — TREATMENT (OUTPATIENT)
Dept: PHYSICAL THERAPY | Facility: CLINIC | Age: 59
End: 2024-12-13
Payer: COMMERCIAL

## 2024-12-13 DIAGNOSIS — S93.412D SPRAIN OF CALCANEOFIBULAR LIGAMENT OF LEFT ANKLE, SUBSEQUENT ENCOUNTER: ICD-10-CM

## 2024-12-13 DIAGNOSIS — S93.492D SPRAIN OF ANTERIOR TALOFIBULAR LIGAMENT OF LEFT ANKLE, SUBSEQUENT ENCOUNTER: ICD-10-CM

## 2024-12-13 PROCEDURE — 97110 THERAPEUTIC EXERCISES: CPT | Mod: GP | Performed by: PHYSICAL THERAPIST

## 2024-12-13 PROCEDURE — 97140 MANUAL THERAPY 1/> REGIONS: CPT | Mod: GP | Performed by: PHYSICAL THERAPIST

## 2024-12-13 ASSESSMENT — PAIN - FUNCTIONAL ASSESSMENT: PAIN_FUNCTIONAL_ASSESSMENT: 0-10

## 2024-12-13 ASSESSMENT — PAIN DESCRIPTION - DESCRIPTORS: DESCRIPTORS: ACHING

## 2024-12-13 ASSESSMENT — PAIN SCALES - GENERAL: PAINLEVEL_OUTOF10: 4

## 2024-12-16 NOTE — PROGRESS NOTES
"Physical Therapy Treatment    Patient Name: Gladys Norris  MRN: [id number]  Today's Date: 12/17/2024  Time Calculation  Start Time: 0703  Stop Time: 0745  Time Calculation (min): 42 min     PT Therapeutic Procedures Time Entry  Manual Therapy Time Entry: 8  Neuromuscular Re-Education Time Entry: 12  Therapeutic Exercise Time Entry: 20                 Current Problem  1. Sprain of anterior talofibular ligament of left ankle, subsequent encounter  Follow Up In Physical Therapy      2. Sprain of calcaneofibular ligament of left ankle, subsequent encounter  Follow Up In Physical Therapy          Insurance:  COPAY 0 DED [id number])   Visit number: 5/8  Authorization info: NO AUTH REQ  Insurance Type: [employer] 60V PT OT ST      Precautions   11/12: WBAT, Wean from boot in to ankle lace up brace, hip/core/lower leg/intrinsic strengthening and balance          Subjective   Subjective:   Pt was out of her brace for 1 and half hours over the weekend, and had a lot of increased pain and couldn't sleep. Since then she, has tried 30 min 2x a day and has tolerated it a little better. The brace still rubs against her ankle, seemingly causing some nerve pain  Pain   2-3/10    Objective   Treatments:  Therapeutic Exercise ([id number]):  PONCHO 5 min   PNF diagonals seated with therapist resistance 15x ea  Sit to stands w/o UE 15x  Standing hip abd/ext B Rtb 10x2  Sidestepping RTB 6 steps x 3 laps  Tandem B 15\"x2 ea  Step ups F/L 4 in. 10x2 ea  Tap downs 4 in 10x  Marching airex B 15x ea  Wobbleboard s/s taps 15x ea        Manual ([id number]):  PROM L ankle STM/PROM , STM to L ankle 8'      Resume as appropriate :  Sidestepping 5 steps x 3 laps----  Toe Yoga x10---  Standing wt shifts L<>R 20x ---  SLS 5x5s----  Wall mini Squats 2x10---  Towel Scrunches 20x---  Tandem B 20\"x 1 ea---    SLR x20  S/L Hip abd x20  Clamshells GTB 2x10   TB Ankle 4 way GTB x10ea  LAQ 3# x20   Toe Extension 20x   ISO DF/PF/IN/EV 10x10s ea   NT---  Seated HR/TR " "20x  Rock 4 way (PWB) x10 ea  Prostretch gastroc strech 10\"x10  Mini squats 15x  L lunge w/ anterior pressure from therapist for DF 10x  Not Performed Today:  Wipers x20   Seated Heel Slide x15  Arch doming x20   Ankle Alphabet x1           OP EDUCATION:   Cont with gradual weening of brace and increasing standing, walking tolerance.      Assessment:   Pt more challenged with tandem when the L foot in posterior position. Pt had some increased anterior ankle pain with HR. Tolerated sidestepping fairly well. Introduced shallow tap downs for more stabilization. Pt did okay with this. Pt displayed fair balance with marching. Fair tolerance to therapy session.    Plan:   Cont to increase weight bearing strengthening ex, to better prepare pt for eventual return to work.               "

## 2024-12-17 ENCOUNTER — TREATMENT (OUTPATIENT)
Dept: PHYSICAL THERAPY | Facility: CLINIC | Age: 59
End: 2024-12-17
Payer: COMMERCIAL

## 2024-12-17 DIAGNOSIS — S93.492D SPRAIN OF ANTERIOR TALOFIBULAR LIGAMENT OF LEFT ANKLE, SUBSEQUENT ENCOUNTER: Primary | ICD-10-CM

## 2024-12-17 DIAGNOSIS — S93.412D SPRAIN OF CALCANEOFIBULAR LIGAMENT OF LEFT ANKLE, SUBSEQUENT ENCOUNTER: ICD-10-CM

## 2024-12-17 PROCEDURE — 97140 MANUAL THERAPY 1/> REGIONS: CPT | Mod: GP,CQ

## 2024-12-17 PROCEDURE — 97110 THERAPEUTIC EXERCISES: CPT | Mod: GP,CQ

## 2024-12-17 PROCEDURE — 97112 NEUROMUSCULAR REEDUCATION: CPT | Mod: GP,CQ

## 2024-12-18 NOTE — PROGRESS NOTES
"Physical Therapy Treatment    Patient Name: Gladys Norris  MRN: [id number]  Today's Date: 12/20/2024  Time Calculation  Start Time: 0659  Stop Time: 0739  Time Calculation (min): 40 min     PT Therapeutic Procedures Time Entry  Therapeutic Exercise Time Entry: 40                 Current Problem  1. Sprain of anterior talofibular ligament of left ankle, subsequent encounter  Follow Up In Physical Therapy      2. Sprain of calcaneofibular ligament of left ankle, subsequent encounter  Follow Up In Physical Therapy            Insurance:  COPAY 0 DED [id number])   Visit number: 6/8  Authorization info: NO AUTH REQ  Insurance Type: MMO [employer] 60V PT OT ST      Precautions   11/12: WBAT, Wean from boot in to ankle lace up brace, hip/core/lower leg/intrinsic strengthening and balance          Subjective   Subjective:   Pt notes having minimal pain but feels insecure when walking on uneven surfaces.  Pain  Pain Assessment: 0-10  0-10 (Numeric) Pain Score: 2  Pain Location: Ankle  Pain Orientation: Left  Pain Descriptors: Aching  Pain Frequency: Intermittent2-3/10    Objective   Instructed pt to begin exs w/o brace w/in tolerance.  Discussed need to consolidate exs in to one session to increase time under tension for a more efficient workout.    Treatments:  Therapeutic Exercise ([id number]):  PONCHO 5 min ---  PNF diagonals seated with therapist resistance 15x ea---  Sit to stands w/o UE 15x---  Standing hip abd/ext B Rtb 10x2---  Sidestepping RTB 6 steps x 3 laps---  Tandem B 15\"x2 ea---  Step ups F/L 4 in. 10x2 ea---  Tap downs 4 in 10x----  Marching airex B 15x ea---  Wobbleboard s/s taps 15x ea---  Supine Calf Strap Stretch 5x30\"  Supine PF/DF/IN/EV RTB 15x10\"  Airex Walks 6 laps  Airex Side steps 3 Laps      Manual ([id number]):  PROM L ankle STM/PROM , STM to L ankle 8'     Resume as appropriate :  Sidestepping 5 steps x 3 laps----  Toe Yoga x10---  Standing wt shifts L<>R 20x ---  SLS 5x5s----  Wall mini Squats 2x10---  Towel " "Scrunches 20x---  Tandem B 20\"x 1 ea---    SLR x20  S/L Hip abd x20  Clamshells GTB 2x10   TB Ankle 4 way GTB x10ea  LAQ 3# x20   Toe Extension 20x   ISO DF/PF/IN/EV 10x10s ea   NT---  Seated HR/TR 20x  Rock 4 way (PWB) x10 ea  Prostretch gastroc strech 10\"x10  Mini squats 15x  L lunge w/ anterior pressure from therapist for DF 10x  Not Performed Today:  Wipers x20   Seated Heel Slide x15  Arch doming x20   Ankle Alphabet x1           OP EDUCATION:   Cont with gradual weening of brace and increasing standing, walking tolerance.      Assessment:  Pt able to tolerate tx session well this date w/ no adverse effects noted from tx.  Pt compliant w/ program and appears to understand rationale for therapy.  Still requires skilled therapy for increasing strength/ROM for performing ADLs. Some pain w/ ankle EV, but tolerated.     Plan:  D/t noted impairments and dysfunction of  L foot, PT will cont to address deficits of strength and ROM via therapeutic exs and modalities PRN and further assist w/ pain reduction.                    "

## 2024-12-20 ENCOUNTER — TREATMENT (OUTPATIENT)
Dept: PHYSICAL THERAPY | Facility: CLINIC | Age: 59
End: 2024-12-20
Payer: COMMERCIAL

## 2024-12-20 DIAGNOSIS — S93.412D SPRAIN OF CALCANEOFIBULAR LIGAMENT OF LEFT ANKLE, SUBSEQUENT ENCOUNTER: ICD-10-CM

## 2024-12-20 DIAGNOSIS — S93.492D SPRAIN OF ANTERIOR TALOFIBULAR LIGAMENT OF LEFT ANKLE, SUBSEQUENT ENCOUNTER: ICD-10-CM

## 2024-12-20 PROCEDURE — 97110 THERAPEUTIC EXERCISES: CPT | Mod: GP | Performed by: PHYSICAL THERAPIST

## 2024-12-20 ASSESSMENT — PAIN SCALES - GENERAL: PAINLEVEL_OUTOF10: 2

## 2024-12-20 ASSESSMENT — PAIN - FUNCTIONAL ASSESSMENT: PAIN_FUNCTIONAL_ASSESSMENT: 0-10

## 2024-12-20 ASSESSMENT — PAIN DESCRIPTION - DESCRIPTORS: DESCRIPTORS: ACHING

## 2024-12-24 ENCOUNTER — OFFICE VISIT (OUTPATIENT)
Dept: ORTHOPEDIC SURGERY | Facility: CLINIC | Age: 59
End: 2024-12-24
Payer: COMMERCIAL

## 2024-12-24 DIAGNOSIS — S93.412A SPRAIN OF CALCANEOFIBULAR LIGAMENT OF LEFT ANKLE, INITIAL ENCOUNTER: Primary | ICD-10-CM

## 2024-12-24 DIAGNOSIS — S93.492A SPRAIN OF ANTERIOR TALOFIBULAR LIGAMENT OF LEFT ANKLE, INITIAL ENCOUNTER: ICD-10-CM

## 2024-12-24 PROCEDURE — 99213 OFFICE O/P EST LOW 20 MIN: CPT | Performed by: ORTHOPAEDIC SURGERY

## 2024-12-24 NOTE — PROGRESS NOTES
This is a pleasant 59 y.o. year old female who presents for fuv of left ankle.  She is doing PT and going well working on balance, stairs, strengthening.  She is having pain/fatigue in her left ankle region after trying to stand for 2 hours at a time.  She uses the ankle lace-up brace but it hits her ankle in a spot that hurts.  She did get her orthotics but is not wearing them currently due to pain in sinus tarsi and plantarlateral foot.  No ankle swelling increase, no locking/catching or popping.      PHYSICAL EXAMINATION  Constitutional Exam: patient's height and weight reviewed, well-kempt  Psychiatric Exam: alert and oriented x 3, appropriate mood and behavior  Eye Exam: LINNEA, EOMI  Pulmonary Exam: breathing non-labored, no apparent distress  Lymphatic exam: no appreciable lymphadenopathy in the lower extremities  Cardiovascular exam: DP pulses 2+ bilaterally, PT 2+ bilaterally, toes are pink with good capillary refill, no pitting edema  Skin exam: no open lesions, rashes, abrasions or ulcerations  Neurological exam: sensation to light touch intact in both lower extremities in peripheral and dermatomal distributions (except for any abnormalities noted in musculoskeletal exam)    Musculoskeletal exam: left ankle and foot: no bony tenderness over metatarsals, no bony tenderness about ankle/hindfoot/midfoot, no current soreness but patient notes sinus tarsi pain at times, negative anterior drawer test, negative talar tilt test, motor strength improved on DF/PF/INV/EV, calf tone and circumference improved, gait improved    ASSESSMENT: s/p left ankle scope with extensive debridement, Carlos Alberto Coles augmented with internal brace 9/25/24  PLAN: Further treatment options discussed including continuing PT, continue working out on stationary bike and pool for fitness and then can work on walking program.  Due to her job requiring a lot of standing for 6-8 or more hours at a time, she will have to remain off of work  until estimated 6 weeks from today; paperwork will be adjusted.  We will see her back in office in 4 weeks to see where she is wrt endurance in leg return.  Discussed use of compression stockings plus ankle sleeve that can be obtained online (might be more comfortable than ankle lace up brace).  Also we recommend that she use her orthotics as her adult onset flatfoot deformity can cause sinus tarsi impingement and lateral foot overload symptoms that she is having.  Discussed with her that the pedorthotist can adjust her orthotics as needed if she would like to make an appointment with them.  The patient's questions were answered in detail.      Note dictated with Gleam software, completed without full type editing to avoid delay.

## 2024-12-27 ENCOUNTER — APPOINTMENT (OUTPATIENT)
Dept: PHYSICAL THERAPY | Facility: CLINIC | Age: 59
End: 2024-12-27
Payer: COMMERCIAL

## 2025-01-03 NOTE — PROGRESS NOTES
"Physical Therapy Treatment    Patient Name: Gladys Norris  MRN: [id number]  Today's Date: 1/7/2025  Time Calculation  Start Time: 0832  Stop Time: 0912  Time Calculation (min): 40 min     PT Therapeutic Procedures Time Entry  Manual Therapy Time Entry: 12  Therapeutic Exercise Time Entry: 28                 Current Problem  1. Sprain of anterior talofibular ligament of left ankle, subsequent encounter  Follow Up In Physical Therapy      2. Sprain of calcaneofibular ligament of left ankle, subsequent encounter  Follow Up In Physical Therapy              Insurance:  COPAY 0 DED [id number])   Visit number: 7/8  Authorization info: NO AUTH REQ  Insurance Type: [employer]MO [employer] 60V PT OT ST      Precautions   11/12: WBAT, Wean from boot in to ankle lace up brace, hip/core/lower leg/intrinsic strengthening and balance        Subjective   Subjective:   Pt notes having minimal pain but feels insecure when walking on uneven surfaces.  Pain  Pain Assessment: 0-10  0-10 (Numeric) Pain Score: 3  Pain Location: Ankle  Pain Orientation: Left  Pain Descriptors: Sore  Pain Frequency: Intermittent2-3/10    Objective     Treatments:  Therapeutic Exercise (28303):  PONCHO 5 min ---  Elliptical 3'  PNF diagonals seated with therapist resistance 15x ea---  Sit to stands w/o UE 15x---  Standing hip abd/ext B RTB 10x2---  Sidestepping RTB 6 steps x 3 laps---  Tandem B 15\"x2 ea---  Step ups F/L 4\" 3x10---  Heel Tap downs 4\" 3x10  Step Overs 4\" 20x  Marching airex B 15x ea---  Wobbleboard F-B/S-S taps 20x ea  Supine Calf Strap Stretch 5x30\"---  Supine PF/DF/IN/EV RTB 15x10\"---  Airex Walks 4 laps  Airex Side steps 4 Laps        Manual ([id number]):  PROM L ankle STM/PROM , STM to L ankle 12'     Resume as appropriate :  Sidestepping 5 steps x 3 laps----  Toe Yoga x10---  Standing wt shifts L<>R 20x ---  SLS 5x5s----  Wall mini Squats 2x10---  Towel Scrunches 20x---  Tandem B 20\"x 1 ea---    SLR x20  S/L Hip abd x20  Clamshells GTB 2x10   TB Ankle 4 way " "GTB x10ea  LAQ 3# x20   Toe Extension 20x   ISO DF/PF/IN/EV 10x10s ea   NT---  Seated HR/TR 20x  Rock 4 way (PWB) x10 ea  Prostretch gastroc strech 10\"x10  Mini squats 15x  L lunge w/ anterior pressure from therapist for DF 10x  Not Performed Today:  Wipers x20   Seated Heel Slide x15  Arch doming x20   Ankle Alphabet x1           OP EDUCATION:   Cont with gradual weening of brace and increasing standing, walking tolerance.      Assessment:  Pt able to tolerate tx session well this date w/ no adverse effects noted from tx.  Pt compliant w/ program and appears to understand rationale for therapy.  Still requires skilled therapy for increasing strength/ROM for performing ADLs. Some pain w/ ankle EV, but tolerated.     Plan:  D/t noted impairments and dysfunction of  L foot, PT will cont to address deficits of strength and ROM via therapeutic exs and modalities PRN and further assist w/ pain reduction.                    "

## 2025-01-07 ENCOUNTER — APPOINTMENT (OUTPATIENT)
Dept: PHYSICAL THERAPY | Facility: CLINIC | Age: 60
End: 2025-01-07
Payer: COMMERCIAL

## 2025-01-07 DIAGNOSIS — S93.492D SPRAIN OF ANTERIOR TALOFIBULAR LIGAMENT OF LEFT ANKLE, SUBSEQUENT ENCOUNTER: ICD-10-CM

## 2025-01-07 DIAGNOSIS — S93.412D SPRAIN OF CALCANEOFIBULAR LIGAMENT OF LEFT ANKLE, SUBSEQUENT ENCOUNTER: ICD-10-CM

## 2025-01-07 PROCEDURE — 97110 THERAPEUTIC EXERCISES: CPT | Mod: GP | Performed by: PHYSICAL THERAPIST

## 2025-01-07 PROCEDURE — 97140 MANUAL THERAPY 1/> REGIONS: CPT | Mod: GP | Performed by: PHYSICAL THERAPIST

## 2025-01-07 ASSESSMENT — PAIN - FUNCTIONAL ASSESSMENT: PAIN_FUNCTIONAL_ASSESSMENT: 0-10

## 2025-01-07 ASSESSMENT — PAIN DESCRIPTION - DESCRIPTORS: DESCRIPTORS: SORE

## 2025-01-07 ASSESSMENT — PAIN SCALES - GENERAL: PAINLEVEL_OUTOF10: 3

## 2025-01-08 NOTE — PROGRESS NOTES
"Physical Therapy Treatment    Patient Name: Gladys Norris  MRN: [id number]  Today's Date: 1/10/2025  Time Calculation  Start Time: 0703  Stop Time: 0747  Time Calculation (min): 44 min     PT Therapeutic Procedures Time Entry  Manual Therapy Time Entry: 10  Therapeutic Exercise Time Entry: 34                 Current Problem  1. Sprain of anterior talofibular ligament of left ankle, subsequent encounter  Follow Up In Physical Therapy      2. Sprain of calcaneofibular ligament of left ankle, subsequent encounter  Follow Up In Physical Therapy                Insurance:  COPAY 0 DED [id number])   Visit number: 7/8  Authorization info: NO AUTH REQ  Insurance Type: MMO [employer] 60V PT OT ST      Precautions   11/12: WBAT, Wean from boot in to ankle lace up brace, hip/core/lower leg/intrinsic strengthening and balance        Subjective   Subjective:   Pt notes having cont'd pain and a bit more burning than usual since last session.  Pain  Pain Assessment: 0-10  0-10 (Numeric) Pain Score: 3  Pain Location: Ankle  Pain Orientation: Left  Pain Descriptors: Tightness, Burning, Aching  Pain Frequency: Constant/continuous2-3/10    Objective     Treatments:  Therapeutic Exercise (26480):  PNOCHO 5 min ---  Elliptical 3'    Wobbleboard F-B/S-S taps 20x ea  Supine Calf Strap Stretch 5x30\"---  Supine PF/DF/IN/EV RTB 15x10\"---  Airex Walks 6 laps   Airex Side steps 4 Laps  SLS on [place] 10x10\"  Wall Mini-Squats 15x      NT---  PNF diagonals seated with therapist resistance 15x ea---  Sit to stands w/o UE 15x---  Standing R hip ext  RTB 10x2  Sidestepping RTB 6 steps x 3 laps---  Tandem B 15\"x2 ea---  Step ups F/L 4\" 3x10---  Heel Tap downs 4\" 3x10---  Step Overs 4\" 20x---  Marching airex B 15x ea---      Manual ([id number]):  PROM L ankle STM/PROM , STM to L ankle      Resume as appropriate :  Sidestepping 5 steps x 3 laps----  Toe Yoga x10---  Standing wt shifts L<>R 20x ---  SLS 5x5s----  Wall mini Squats 2x10---  Towel Scrunches " "20x---  Tandem B 20\"x 1 ea---    SLR x20  S/L Hip abd x20  Clamshells GTB 2x10   TB Ankle 4 way GTB x10ea  LAQ 3# x20   Toe Extension 20x   ISO DF/PF/IN/EV 10x10s ea   NT---  Seated HR/TR 20x  Rock 4 way (PWB) x10 ea  Prostretch gastroc strech 10\"x10  Mini squats 15x  L lunge w/ anterior pressure from therapist for DF 10x  Not Performed Today:  Wipers x20   Seated Heel Slide x15  Arch doming x20   Ankle Alphabet x1           OP EDUCATION:   Cont with gradual weening of brace and increasing standing, walking tolerance.      Assessment:  Pt able to tolerate tx session well this date w/ no adverse effects noted from tx.  Pt compliant w/ program and appears to understand rationale for therapy.  Still requires skilled therapy for increasing strength/ROM for performing ADLs. Some pain w/ ankle EV, but tolerated.     Plan:  D/t noted impairments and dysfunction of  L foot, PT will cont to address deficits of strength and ROM via therapeutic exs and modalities PRN and further assist w/ pain reduction.                    "

## 2025-01-10 ENCOUNTER — APPOINTMENT (OUTPATIENT)
Dept: PHYSICAL THERAPY | Facility: CLINIC | Age: 60
End: 2025-01-10
Payer: COMMERCIAL

## 2025-01-10 DIAGNOSIS — S93.492D SPRAIN OF ANTERIOR TALOFIBULAR LIGAMENT OF LEFT ANKLE, SUBSEQUENT ENCOUNTER: ICD-10-CM

## 2025-01-10 DIAGNOSIS — S93.412D SPRAIN OF CALCANEOFIBULAR LIGAMENT OF LEFT ANKLE, SUBSEQUENT ENCOUNTER: ICD-10-CM

## 2025-01-10 PROCEDURE — 97140 MANUAL THERAPY 1/> REGIONS: CPT | Mod: GP | Performed by: PHYSICAL THERAPIST

## 2025-01-10 PROCEDURE — 97110 THERAPEUTIC EXERCISES: CPT | Mod: GP | Performed by: PHYSICAL THERAPIST

## 2025-01-10 ASSESSMENT — PAIN - FUNCTIONAL ASSESSMENT: PAIN_FUNCTIONAL_ASSESSMENT: 0-10

## 2025-01-10 ASSESSMENT — PAIN DESCRIPTION - DESCRIPTORS: DESCRIPTORS: TIGHTNESS;BURNING;ACHING

## 2025-01-10 ASSESSMENT — PAIN SCALES - GENERAL: PAINLEVEL_OUTOF10: 3

## 2025-01-10 NOTE — PROGRESS NOTES
"Physical Therapy Treatment/Recheck    Patient Name: Gladys Norris  MRN: [id number]  Today's Date: 1/15/2025  Time Calculation  Start Time: 0826  Stop Time: 0905  Time Calculation (min): 39 min     PT Therapeutic Procedures Time Entry  Therapeutic Exercise Time Entry: 39                 Current Problem  1. Sprain of anterior talofibular ligament of left ankle, subsequent encounter  Follow Up In Physical Therapy      2. Sprain of calcaneofibular ligament of left ankle, subsequent encounter  Follow Up In Physical Therapy                  Insurance:  COPAY 0 DED [id number])   Visit number: 8/8 (next visit 1 of 8)  Total Visits to Date 16  Authorization info: NO AUTH REQ  Insurance Type: MMO [employer] 60V PT OT ST      Precautions   11/12: WBAT, Wean from boot in to ankle lace up brace, hip/core/lower leg/intrinsic strengthening and balance        Subjective   Subjective:   Pt notes some pain, but not as  bad.  Also, plans on return to work 2/4/25    Pain  Pain Assessment: 0-10  0-10 (Numeric) Pain Score: 2  Pain Location: Ankle  Pain Orientation: Left  Pain Descriptors: Tightness  Clinical Progression: Gradually improving2-3/10    Objective   Other Measures  Lower Extremity Funtional Score (LEFS): 44    Ankle AROM  PF 40  DF 10  IN 10  EV 4     Ankle MMT 0/5  PF 5  DF 5  IN 4+  EV 4      Treatments:  Therapeutic Exercise (78109):  PONCHO 5 min ---  Elliptical 3'      Wobbleboard F-B/S-S taps 20x ea  Supine Calf Strap Stretch 5x30\"---  Supine PF/DF/IN/EV RTB 15x10\"---  Airex Walks 6 laps   Airex Side steps 4 Laps  SLS on Blue Oval 10x10\"  Wall Mini-Squats 15x  Wall Calf/Soleus Stretches 5x30\" ea alternating      NT---  PNF diagonals seated with therapist resistance 15x ea---  Sit to stands w/o UE 15x---  Standing R hip ext  RTB 10x2  Sidestepping RTB 6 steps x 3 laps---  Tandem B 15\"x2 ea---  Step ups F/L 4\" 3x10---  Heel Tap downs 4\" 3x10---  Step Overs 4\" 20x---  Marching airex B 15x ea---      Manual (21332):  PROM L ankle " "STM/PROM , STM to L ankle      Resume as appropriate :  Sidestepping 5 steps x 3 laps----  Toe Yoga x10---  Standing wt shifts L<>R 20x ---  SLS 5x5s----  Wall mini Squats 2x10---  Towel Scrunches 20x---  Tandem B 20\"x 1 ea---    SLR x20  S/L Hip abd x20  Clamshells GTB 2x10   TB Ankle 4 way GTB x10ea  LAQ 3# x20   Toe Extension 20x   ISO DF/PF/IN/EV 10x10s ea   NT---  Seated HR/TR 20x  Rock 4 way (PWB) x10 ea  Prostretch gastroc strech 10\"x10  Mini squats 15x  L lunge w/ anterior pressure from therapist for DF 10x  Not Performed Today:  Wipers x20   Seated Heel Slide x15  Arch doming x20   Ankle Alphabet x1           OP EDUCATION:   Cont with gradual weening of brace and increasing standing, walking tolerance.    Goals: Set and discussed today  Increase ROM to WFL for R ankle-NM  Increase Strength where deficits noted by 1/3 to 1 mm grade for R ankle-MET  Ind w/ HEP to expedite progress and promote goal achievement for R ankle -MET  Decrease Outcome score to for evidence of improved function for R ankle-MET  Return to PLOF for R ankle-NM  Decrease pain to 0/10 or less post ex session-NM      Assessment:  Pt able to tolerate tx session well this date w/ no adverse effects noted from tx.  Pt compliant w/ program and appears to understand rationale for therapy.  Still requires skilled therapy for increasing strength/ROM for performing ADLs. Some pain w/ ankle EV, but tolerated.  Pt making good gains toward goals.     Plan:  D/t noted impairments and dysfunction of  L foot, PT will cont to address deficits of strength and ROM via therapeutic exs and modalities PRN and further assist w/ pain reduction.  Cont PT 1-2x/wk for ~7 weeks.  PT to go 1x/wk once pt starts work.                    "

## 2025-01-13 ENCOUNTER — APPOINTMENT (OUTPATIENT)
Dept: PHYSICAL THERAPY | Facility: CLINIC | Age: 60
End: 2025-01-13
Payer: COMMERCIAL

## 2025-01-15 ENCOUNTER — APPOINTMENT (OUTPATIENT)
Dept: PHYSICAL THERAPY | Facility: CLINIC | Age: 60
End: 2025-01-15
Payer: COMMERCIAL

## 2025-01-15 DIAGNOSIS — S93.412D SPRAIN OF CALCANEOFIBULAR LIGAMENT OF LEFT ANKLE, SUBSEQUENT ENCOUNTER: ICD-10-CM

## 2025-01-15 DIAGNOSIS — S93.492D SPRAIN OF ANTERIOR TALOFIBULAR LIGAMENT OF LEFT ANKLE, SUBSEQUENT ENCOUNTER: ICD-10-CM

## 2025-01-15 PROCEDURE — 97110 THERAPEUTIC EXERCISES: CPT | Mod: GP | Performed by: PHYSICAL THERAPIST

## 2025-01-15 ASSESSMENT — PAIN - FUNCTIONAL ASSESSMENT: PAIN_FUNCTIONAL_ASSESSMENT: 0-10

## 2025-01-15 ASSESSMENT — PAIN SCALES - GENERAL: PAINLEVEL_OUTOF10: 2

## 2025-01-15 ASSESSMENT — PAIN DESCRIPTION - DESCRIPTORS: DESCRIPTORS: TIGHTNESS

## 2025-01-20 ENCOUNTER — APPOINTMENT (OUTPATIENT)
Dept: PHYSICAL THERAPY | Facility: CLINIC | Age: 60
End: 2025-01-20
Payer: COMMERCIAL

## 2025-01-20 NOTE — PROGRESS NOTES
"Physical Therapy Treatment/Recheck    Patient Name: Gladys Norris  MRN: [id number]  Today's Date: 1/20/2025                          Current Problem  No diagnosis found.              Insurance:  COPAY 0 DED [id number])   Visit number: 8/8 (next visit 1 of 8)  Total Visits to Date 16  Authorization info: NO AUTH REQ  Insurance Type: MMO SU[employer] 60V PT OT ST      Precautions   11/12: WBAT, Wean from boot in to ankle lace up brace, hip/core/lower leg/intrinsic strengthening and balance        Subjective   Subjective:   Pt notes some pain, but not as  bad.  Also, plans on return to work 2/4/25    Pain   2-3/10    Objective        Ankle AROM  PF 40  DF 10  IN 10  EV 4     Ankle MMT 0/5  PF 5  DF 5  IN 4+  EV 4      Treatments:  Therapeutic Exercise (70650):  PONCHO 5 min ---  Elliptical 3'      Wobbleboard F-B/S-S taps 20x ea  Supine Calf Strap Stretch 5x30\"---  Supine PF/DF/IN/EV RTB 15x10\"---  Airex Walks 6 laps   Airex Side steps 4 Laps  SLS on Blue Oval 10x10\"  Wall Mini-Squats 15x  Wall Calf/Soleus Stretches 5x30\" ea alternating      NT---  PNF diagonals seated with therapist resistance 15x ea---  Sit to stands w/o UE 15x---  Standing R hip ext  RTB 10x2  Sidestepping RTB 6 steps x 3 laps---  Tandem B 15\"x2 ea---  Step ups F/L 4\" 3x10---  Heel Tap downs 4\" 3x10---  Step Overs 4\" 20x---  Marching airex B 15x ea---      Manual (01529):  PROM L ankle STM/PROM , STM to L ankle      Resume as appropriate :  Sidestepping 5 steps x 3 laps----  Toe Yoga x10---  Standing wt shifts L<>R 20x ---  SLS 5x5s----  Wall mini Squats 2x10---  Towel Scrunches 20x---  Tandem B 20\"x 1 ea---    SLR x20  S/L Hip abd x20  Clamshells GTB 2x10   TB Ankle 4 way GTB x10ea  LAQ 3# x20   Toe Extension 20x   ISO DF/PF/IN/EV 10x10s ea   NT---  Seated HR/TR 20x  Rock 4 way (PWB) x10 ea  Prostretch gastroc strech 10\"x10  Mini squats 15x  L lunge w/ anterior pressure from therapist for DF 10x  Not Performed Today:  Wipers x20   Seated Heel Slide x15  Arch " doming x20   Ankle Alphabet x1           OP EDUCATION:   Cont with gradual weening of brace and increasing standing, walking tolerance.    Goals: Set and discussed today  Increase ROM to WFL for R ankle-NM  Increase Strength where deficits noted by 1/3 to 1 mm grade for R ankle-MET  Ind w/ HEP to expedite progress and promote goal achievement for R ankle -MET  Decrease Outcome score to for evidence of improved function for R ankle-MET  Return to PLOF for R ankle-NM  Decrease pain to 0/10 or less post ex session-NM      Assessment:  Pt able to tolerate tx session well this date w/ no adverse effects noted from tx.  Pt compliant w/ program and appears to understand rationale for therapy.  Still requires skilled therapy for increasing strength/ROM for performing ADLs. Some pain w/ ankle EV, but tolerated.  Pt making good gains toward goals.     Plan:  D/t noted impairments and dysfunction of  L foot, PT will cont to address deficits of strength and ROM via therapeutic exs and modalities PRN and further assist w/ pain reduction.  Cont PT 1-2x/wk for ~7 weeks.  PT to go 1x/wk once pt starts work.

## 2025-01-21 ENCOUNTER — APPOINTMENT (OUTPATIENT)
Dept: ORTHOPEDIC SURGERY | Facility: CLINIC | Age: 60
End: 2025-01-21
Payer: COMMERCIAL

## 2025-01-21 ENCOUNTER — PATIENT MESSAGE (OUTPATIENT)
Dept: PRIMARY CARE | Facility: CLINIC | Age: 60
End: 2025-01-21
Payer: COMMERCIAL

## 2025-01-21 DIAGNOSIS — Z12.31 ENCOUNTER FOR SCREENING MAMMOGRAM FOR MALIGNANT NEOPLASM OF BREAST: Primary | ICD-10-CM

## 2025-01-21 NOTE — PROGRESS NOTES
"Physical Therapy Treatment/Recheck    Patient Name: Gladys Norris  MRN: [id number]  Today's Date: 1/21/2025                          Current Problem  No diagnosis found.              Insurance:  COPAY 0 DED [id number])   Visit number: 8/8 (next visit 1 of 8)  Total Visits to Date 16  Authorization info: NO AUTH REQ  Insurance Type: MMO [employer] 60V PT OT ST      Precautions   11/12: WBAT, Wean from boot in to ankle lace up brace, hip/core/lower leg/intrinsic strengthening and balance        Subjective   Subjective:   Pt notes some pain, but not as  bad.  Also, plans on return to work 2/4/25    Pain   2-3/10    Objective        Ankle AROM  PF 40  DF 10  IN 10  EV 4     Ankle MMT 0/5  PF 5  DF 5  IN 4+  EV 4      Treatments:  Therapeutic Exercise (74962):  PONCHO 5 min ---  Elliptical 3'      Wobbleboard F-B/S-S taps 20x ea  Supine Calf Strap Stretch 5x30\"---  Supine PF/DF/IN/EV RTB 15x10\"---  Airex Walks 6 laps   Airex Side steps 4 Laps  SLS on Blue Oval 10x10\"  Wall Mini-Squats 15x  Wall Calf/Soleus Stretches 5x30\" ea alternating      NT---  PNF diagonals seated with therapist resistance 15x ea---  Sit to stands w/o UE 15x---  Standing R hip ext  RTB 10x2  Sidestepping RTB 6 steps x 3 laps---  Tandem B 15\"x2 ea---  Step ups F/L 4\" 3x10---  Heel Tap downs 4\" 3x10---  Step Overs 4\" 20x---  Marching airex B 15x ea---      Manual (15645):  PROM L ankle STM/PROM , STM to L ankle      Resume as appropriate :  Sidestepping 5 steps x 3 laps----  Toe Yoga x10---  Standing wt shifts L<>R 20x ---  SLS 5x5s----  Wall mini Squats 2x10---  Towel Scrunches 20x---  Tandem B 20\"x 1 ea---    SLR x20  S/L Hip abd x20  Clamshells GTB 2x10   TB Ankle 4 way GTB x10ea  LAQ 3# x20   Toe Extension 20x   ISO DF/PF/IN/EV 10x10s ea   NT---  Seated HR/TR 20x  Rock 4 way (PWB) x10 ea  Prostretch gastroc strech 10\"x10  Mini squats 15x  L lunge w/ anterior pressure from therapist for DF 10x  Not Performed Today:  Wipers x20   Seated Heel Slide x15  Arch " doming x20   Ankle Alphabet x1           OP EDUCATION:   Cont with gradual weening of brace and increasing standing, walking tolerance.    Goals: Set and discussed today  Increase ROM to WFL for R ankle-NM  Increase Strength where deficits noted by 1/3 to 1 mm grade for R ankle-MET  Ind w/ HEP to expedite progress and promote goal achievement for R ankle -MET  Decrease Outcome score to for evidence of improved function for R ankle-MET  Return to PLOF for R ankle-NM  Decrease pain to 0/10 or less post ex session-NM      Assessment:  Pt able to tolerate tx session well this date w/ no adverse effects noted from tx.  Pt compliant w/ program and appears to understand rationale for therapy.  Still requires skilled therapy for increasing strength/ROM for performing ADLs. Some pain w/ ankle EV, but tolerated.  Pt making good gains toward goals.     Plan:  D/t noted impairments and dysfunction of  L foot, PT will cont to address deficits of strength and ROM via therapeutic exs and modalities PRN and further assist w/ pain reduction.  Cont PT 1-2x/wk for ~7 weeks.  PT to go 1x/wk once pt starts work.

## 2025-01-22 ENCOUNTER — APPOINTMENT (OUTPATIENT)
Dept: PHYSICAL THERAPY | Facility: CLINIC | Age: 60
End: 2025-01-22
Payer: COMMERCIAL

## 2025-01-23 NOTE — PROGRESS NOTES
"Physical Therapy Treatment/Recheck    Patient Name: Gladys Norris  MRN: [id number]  Today's Date: 1/23/2025                          Current Problem  No diagnosis found.              Insurance:  COPAY 0 DED [id number])   Visit number: 8/8 (next visit 1 of 8)  Total Visits to Date 16  Authorization info: NO AUTH REQ  Insurance Type: MMO [employer] 60V PT OT ST      Precautions   11/12: WBAT, Wean from boot in to ankle lace up brace, hip/core/lower leg/intrinsic strengthening and balance        Subjective   Subjective:   Pt notes some pain, but not as  bad.  Also, plans on return to work 2/4/25    Pain   2-3/10    Objective        Ankle AROM  PF 40  DF 10  IN 10  EV 4     Ankle MMT 0/5  PF 5  DF 5  IN 4+  EV 4      Treatments:  Therapeutic Exercise (13244):  PONCHO 5 min ---  Elliptical 3'      Wobbleboard F-B/S-S taps 20x ea  Supine Calf Strap Stretch 5x30\"---  Supine PF/DF/IN/EV RTB 15x10\"---  Airex Walks 6 laps   Airex Side steps 4 Laps  SLS on Blue Oval 10x10\"  Wall Mini-Squats 15x  Wall Calf/Soleus Stretches 5x30\" ea alternating      NT---  PNF diagonals seated with therapist resistance 15x ea---  Sit to stands w/o UE 15x---  Standing R hip ext  RTB 10x2  Sidestepping RTB 6 steps x 3 laps---  Tandem B 15\"x2 ea---  Step ups F/L 4\" 3x10---  Heel Tap downs 4\" 3x10---  Step Overs 4\" 20x---  Marching airex B 15x ea---      Manual (97345):  PROM L ankle STM/PROM , STM to L ankle      Resume as appropriate :  Sidestepping 5 steps x 3 laps----  Toe Yoga x10---  Standing wt shifts L<>R 20x ---  SLS 5x5s----  Wall mini Squats 2x10---  Towel Scrunches 20x---  Tandem B 20\"x 1 ea---    SLR x20  S/L Hip abd x20  Clamshells GTB 2x10   TB Ankle 4 way GTB x10ea  LAQ 3# x20   Toe Extension 20x   ISO DF/PF/IN/EV 10x10s ea   NT---  Seated HR/TR 20x  Rock 4 way (PWB) x10 ea  Prostretch gastroc strech 10\"x10  Mini squats 15x  L lunge w/ anterior pressure from therapist for DF 10x  Not Performed Today:  Wipers x20   Seated Heel Slide x15  Arch " doming x20   Ankle Alphabet x1           OP EDUCATION:   Cont with gradual weening of brace and increasing standing, walking tolerance.    Goals: Set and discussed today  Increase ROM to WFL for R ankle-NM  Increase Strength where deficits noted by 1/3 to 1 mm grade for R ankle-MET  Ind w/ HEP to expedite progress and promote goal achievement for R ankle -MET  Decrease Outcome score to for evidence of improved function for R ankle-MET  Return to PLOF for R ankle-NM  Decrease pain to 0/10 or less post ex session-NM      Assessment:  Pt able to tolerate tx session well this date w/ no adverse effects noted from tx.  Pt compliant w/ program and appears to understand rationale for therapy.  Still requires skilled therapy for increasing strength/ROM for performing ADLs. Some pain w/ ankle EV, but tolerated.  Pt making good gains toward goals.     Plan:  D/t noted impairments and dysfunction of  L foot, PT will cont to address deficits of strength and ROM via therapeutic exs and modalities PRN and further assist w/ pain reduction.  Cont PT 1-2x/wk for ~7 weeks.  PT to go 1x/wk once pt starts work.

## 2025-01-27 ENCOUNTER — PATIENT MESSAGE (OUTPATIENT)
Dept: PRIMARY CARE | Facility: CLINIC | Age: 60
End: 2025-01-27

## 2025-01-27 ENCOUNTER — APPOINTMENT (OUTPATIENT)
Dept: PHYSICAL THERAPY | Facility: CLINIC | Age: 60
End: 2025-01-27
Payer: COMMERCIAL

## 2025-01-27 DIAGNOSIS — J40 BRONCHITIS: Primary | ICD-10-CM

## 2025-01-28 ENCOUNTER — OFFICE VISIT (OUTPATIENT)
Dept: ORTHOPEDIC SURGERY | Facility: CLINIC | Age: 60
End: 2025-01-28
Payer: COMMERCIAL

## 2025-01-28 DIAGNOSIS — S93.412A SPRAIN OF CALCANEOFIBULAR LIGAMENT OF LEFT ANKLE, INITIAL ENCOUNTER: ICD-10-CM

## 2025-01-28 DIAGNOSIS — S93.492A SPRAIN OF ANTERIOR TALOFIBULAR LIGAMENT OF LEFT ANKLE, INITIAL ENCOUNTER: Primary | ICD-10-CM

## 2025-01-28 PROCEDURE — 99213 OFFICE O/P EST LOW 20 MIN: CPT | Performed by: ORTHOPAEDIC SURGERY

## 2025-01-28 RX ORDER — ALBUTEROL SULFATE 90 UG/1
2 INHALANT RESPIRATORY (INHALATION) EVERY 4 HOURS PRN
Qty: 8.5 G | Refills: 5 | Status: SHIPPED | OUTPATIENT
Start: 2025-01-28 | End: 2026-01-28

## 2025-01-28 NOTE — PROGRESS NOTES
This is a pleasant 59 y.o. year old female who presents for fuv of left ankle.  She has made excellent progress, pool exercises really helped.  She is set to return to work next week.  Endurance in leg improved, using orthotics.    PHYSICAL EXAMINATION  Constitutional Exam: patient's height and weight reviewed, well-kempt  Psychiatric Exam: alert and oriented x 3, appropriate mood and behavior  Eye Exam: LINNEA, EOMI  Pulmonary Exam: breathing non-labored, no apparent distress  Lymphatic exam: no appreciable lymphadenopathy in the lower extremities  Cardiovascular exam: DP pulses 2+ bilaterally, PT 2+ bilaterally, toes are pink with good capillary refill, no pitting edema  Skin exam: no open lesions, rashes, abrasions or ulcerations  Neurological exam: sensation to light touch intact in both lower extremities in peripheral and dermatomal distributions (except for any abnormalities noted in musculoskeletal exam)    Musculoskeletal exam: left ankle: no effusion, full ROM, negative anterior drawer and talar tilt test, motor strength 5/5 for DF/PF/INV/EV, normal gait, good balance, calf tone and girth improved    ASSESSMENT: s/p left ankle scope with extensive debridement, Brostrum Coles augmented with internal brace 9/25/24   PLAN: She will continue to do her HEP and exercise.  She is medically cleared to return to work on or after 2/4/25.  FUV as needed or if any questions or concerns.  The patient's questions were answered in detail.      Note dictated with Web[employer] software, completed without full type editing to avoid delay.

## 2025-01-29 RX ORDER — DOXYCYCLINE 100 MG/1
100 CAPSULE ORAL 2 TIMES DAILY
Qty: 20 CAPSULE | Refills: 0 | Status: SHIPPED | OUTPATIENT
Start: 2025-01-29 | End: 2025-02-08

## 2025-01-29 RX ORDER — PREDNISONE 5 MG/1
TABLET ORAL
Qty: 30 TABLET | Refills: 0 | Status: SHIPPED | OUTPATIENT
Start: 2025-01-29

## 2025-01-30 ENCOUNTER — OFFICE VISIT (OUTPATIENT)
Dept: PRIMARY CARE | Facility: CLINIC | Age: 60
End: 2025-01-30
Payer: COMMERCIAL

## 2025-01-30 ENCOUNTER — HOSPITAL ENCOUNTER (OUTPATIENT)
Dept: RADIOLOGY | Facility: CLINIC | Age: 60
Discharge: HOME | End: 2025-01-30
Payer: COMMERCIAL

## 2025-01-30 VITALS
HEIGHT: 69 IN | SYSTOLIC BLOOD PRESSURE: 138 MMHG | OXYGEN SATURATION: 99 % | HEART RATE: 64 BPM | BODY MASS INDEX: 31.34 KG/M2 | WEIGHT: 211.6 LBS | DIASTOLIC BLOOD PRESSURE: 84 MMHG

## 2025-01-30 DIAGNOSIS — I10 BENIGN ESSENTIAL HYPERTENSION: ICD-10-CM

## 2025-01-30 DIAGNOSIS — J30.2 SEASONAL ALLERGIES: ICD-10-CM

## 2025-01-30 DIAGNOSIS — J45.41 MODERATE PERSISTENT ASTHMA WITH ACUTE EXACERBATION (HHS-HCC): Primary | ICD-10-CM

## 2025-01-30 DIAGNOSIS — N76.0 ACUTE VAGINITIS: ICD-10-CM

## 2025-01-30 DIAGNOSIS — J45.41 MODERATE PERSISTENT ASTHMA WITH ACUTE EXACERBATION (HHS-HCC): ICD-10-CM

## 2025-01-30 PROCEDURE — 1036F TOBACCO NON-USER: CPT | Performed by: INTERNAL MEDICINE

## 2025-01-30 PROCEDURE — 3008F BODY MASS INDEX DOCD: CPT | Performed by: INTERNAL MEDICINE

## 2025-01-30 PROCEDURE — 71046 X-RAY EXAM CHEST 2 VIEWS: CPT

## 2025-01-30 PROCEDURE — 99214 OFFICE O/P EST MOD 30 MIN: CPT | Performed by: INTERNAL MEDICINE

## 2025-01-30 PROCEDURE — 3075F SYST BP GE 130 - 139MM HG: CPT | Performed by: INTERNAL MEDICINE

## 2025-01-30 PROCEDURE — 3079F DIAST BP 80-89 MM HG: CPT | Performed by: INTERNAL MEDICINE

## 2025-01-30 RX ORDER — CEFUROXIME AXETIL 500 MG/1
500 TABLET ORAL 2 TIMES DAILY
Qty: 20 TABLET | Refills: 0 | Status: SHIPPED | OUTPATIENT
Start: 2025-01-30 | End: 2025-02-09

## 2025-01-30 RX ORDER — ALBUTEROL SULFATE 0.83 MG/ML
2.5 SOLUTION RESPIRATORY (INHALATION) EVERY 4 HOURS PRN
Qty: 75 ML | Refills: 11 | Status: SHIPPED | OUTPATIENT
Start: 2025-01-30 | End: 2026-01-30

## 2025-01-30 ASSESSMENT — PATIENT HEALTH QUESTIONNAIRE - PHQ9
SUM OF ALL RESPONSES TO PHQ9 QUESTIONS 1 AND 2: 0
2. FEELING DOWN, DEPRESSED OR HOPELESS: NOT AT ALL
1. LITTLE INTEREST OR PLEASURE IN DOING THINGS: NOT AT ALL

## 2025-01-30 NOTE — PROGRESS NOTES
"Subjective   Patient ID: Gladys Norris is a 59 y.o. female who presents for Dizziness, Cough, and Shortness of Breath.    Here with her  Fabio with worsening cough over the last week-end of the last 3 days more of a moist cough.  Negative COVID test.  Yesterday she was started on steroids and doxycycline.  The doxycycline seems to have caused nausea.  Sinuses have been congested and somewhat painful.  Her cough paroxysms sometimes cause dizziness.  She has not had any posttussive emesis.  Cough is now productive of more yellow, white and occasional blood-tinged phlegm.  Steroids have caused a little bit of issues sleeping  Her pulse ox this morning was 93, typically runs in the 96-97 range      Her asthma has not been as bad since her first bout of COVID, several years ago.         Review of Systems    Objective   /84 (BP Location: Right arm, Patient Position: Sitting, BP Cuff Size: Large adult)   Pulse 64   Ht 1.753 m (5' 9\")   Wt 96 kg (211 lb 9.6 oz)   SpO2 99%   BMI 31.25 kg/m²     Physical Exam  Constitutional:       Comments: This is a well-developed patient, a bit congested, in no respiratory distress  She spoke in full sentences with occasional cough  Eye exam revealed pupils equally round and reactive, with extraocular muscles intact. Normal sclera and eyelids.  Minimal sinus pressure  Ear exam without pain on palpation. Otic canal without obvious tympanic membrane erythema or air-fluid levels.  Oral exam revealed unremarkable lips, pharynx and palate. No obvious ulcers, exudates or lesions noted.  Neck exam revealed no masses, adenopathy or thyromegaly. No neck pain on range of motion was noted.  Pulmonary exam revealed clear breath sounds bilaterally in all lung fields. No wheezes bronchitis or rales noted.  Cardiac exam revealed regular rate and rhythm without murmurs gallops or rubs.  Skin exam without any rash  On mental status exam, judgment and insight appear intact. Alert and " oriented x3. No apparent mood or cognitive impairment. Affect was bright, without evidence of depression or anxiety.         Assessment/Plan   Problem List Items Addressed This Visit             ICD-10-CM    Benign essential hypertension I10    Seasonal allergies J30.2    Moderate persistent asthma with acute exacerbation ([facility]) - Primary J45.41    Relevant Medications    cefuroxime (Ceftin) 500 mg tablet    albuterol 2.5 mg /3 mL (0.083 %) nebulizer solution    Other Relevant Orders    XR chest 2 views     Other Visit Diagnoses         Codes    Acute vaginitis     N76.0             We spoke over 25 minutes over half the time counseling  I spent another 5 minutes on documentation    Care assistance-her  Fabio was with her today to help    Asthmatic bronchitis-we spoke at length, regarding her worsening symptoms over the last 5 to 7 days.  She understands if her symptoms worsen or if she becomes uncomfortable, she needs to go to the emergency room for immediate evaluation.  Instructions written out to optimize compliance    Chest x-ray ordered  She will push fluids  She will follow oxygenation levels  Albuterol nebulizer-initially every 2-4 hours.  Prescription provided for home nebulizer  Steroid taper-she will continue the present steroid taper as ordered  Ulcer prophylaxis-she will continue her Pepcid 40 mg daily  Antibiotic-she will use Ceftin with a probiotic  OTC meds-she will continue Mucinex 600 twice daily as well as 12-hour Sudafed, each morning along with Sudafed through the day  Nasal steroid-she will advance Flonase to twice daily  Vaginitis concerns-Diflucan discussed but with drug interaction she will use topical Monistat as needed    She will call with additional concerns, otherwise follow-up with her PCP, Dr. Buchanan as scheduled.    Charting was completed using voice recognition technology and may include unintended errors.

## 2025-02-02 NOTE — RESULT ENCOUNTER NOTE
Gladys    Thanks for doing the chest x-ray.  I am glad the radiologist does not see any worrisome findings.  Please let us know if any pulmonary symptoms persist.    Sincerely,  Jerrell Winkler MD  covering for Dr. Buchanan

## 2025-02-03 ENCOUNTER — TREATMENT (OUTPATIENT)
Dept: PHYSICAL THERAPY | Facility: CLINIC | Age: 60
End: 2025-02-03
Payer: COMMERCIAL

## 2025-02-03 DIAGNOSIS — S93.412D SPRAIN OF CALCANEOFIBULAR LIGAMENT OF LEFT ANKLE, SUBSEQUENT ENCOUNTER: ICD-10-CM

## 2025-02-03 DIAGNOSIS — S93.492D SPRAIN OF ANTERIOR TALOFIBULAR LIGAMENT OF LEFT ANKLE, SUBSEQUENT ENCOUNTER: ICD-10-CM

## 2025-02-03 PROCEDURE — 97110 THERAPEUTIC EXERCISES: CPT | Mod: GP,CQ

## 2025-02-03 ASSESSMENT — PAIN - FUNCTIONAL ASSESSMENT: PAIN_FUNCTIONAL_ASSESSMENT: 0-10

## 2025-02-03 ASSESSMENT — PAIN SCALES - GENERAL: PAINLEVEL_OUTOF10: 4

## 2025-02-03 NOTE — PROGRESS NOTES
Physical Therapy Treatment    Patient Name: Gladys Norris  MRN: [id number]  Today's Date: 2/3/2025  Time Calculation  Start Time: 0415  Stop Time: 0453  Time Calculation (min): 38 min  PT Therapeutic Procedures Time Entry  Therapeutic Exercise Time Entry: 38       Current Problem  1. Sprain of anterior talofibular ligament of left ankle, subsequent encounter  Follow Up In Physical Therapy      2. Sprain of calcaneofibular ligament of left ankle, subsequent encounter  Follow Up In Physical Therapy          Subjective   General   Pt stating some stabbing pain lateral ankle after a pretty active day yesterday and today.   Insurance   COPAY 0 DED [id number])   Total Visits to Date 17  Authorization info: NO AUTH REQ  Insurance Type: MMO SUPER MED 60V PT OT ST   Precautions     Pain  Pain Assessment: 0-10  0-10 (Numeric) Pain Score: 4  Pain Type: Chronic pain  Pain Location: Ankle  Pain Orientation: Left    Objective   Treatments:  Osman, 5 minutes  HR/TR, 1/2 roll, 20-30 reps  Wobble board F/L, 20-30 reps  Step ups F/L, BOSU, 20 reps  SLS, black foam pad, 20 sec holds, 5 reps  Heel taps F, 4in, 2x10    Desensitization  Assessment   Pt was able to work through some discomfort today with pain going from sharp to normal aching soreness. Discussed nerve sensitivity as potential issue and discussed desensitization techniques. Was a bit more limited today, but is doing well overall.     Plan:  Cont to progress strength and endurance     OP EDUCATION:       Goals:

## 2025-02-10 ENCOUNTER — APPOINTMENT (OUTPATIENT)
Dept: PHYSICAL THERAPY | Facility: CLINIC | Age: 60
End: 2025-02-10
Payer: COMMERCIAL

## 2025-02-10 DIAGNOSIS — S93.492D SPRAIN OF ANTERIOR TALOFIBULAR LIGAMENT OF LEFT ANKLE, SUBSEQUENT ENCOUNTER: Primary | ICD-10-CM

## 2025-02-10 DIAGNOSIS — S93.412D SPRAIN OF CALCANEOFIBULAR LIGAMENT OF LEFT ANKLE, SUBSEQUENT ENCOUNTER: ICD-10-CM

## 2025-02-17 ENCOUNTER — APPOINTMENT (OUTPATIENT)
Dept: PHYSICAL THERAPY | Facility: CLINIC | Age: 60
End: 2025-02-17
Payer: COMMERCIAL

## 2025-02-20 ENCOUNTER — APPOINTMENT (OUTPATIENT)
Dept: RHEUMATOLOGY | Facility: CLINIC | Age: 60
End: 2025-02-20
Payer: COMMERCIAL

## 2025-02-20 ENCOUNTER — APPOINTMENT (OUTPATIENT)
Dept: PHYSICAL THERAPY | Facility: CLINIC | Age: 60
End: 2025-02-20
Payer: COMMERCIAL

## 2025-02-20 ENCOUNTER — OFFICE VISIT (OUTPATIENT)
Dept: RHEUMATOLOGY | Facility: CLINIC | Age: 60
End: 2025-02-20
Payer: COMMERCIAL

## 2025-02-20 VITALS
BODY MASS INDEX: 31.77 KG/M2 | HEART RATE: 61 BPM | HEIGHT: 69 IN | OXYGEN SATURATION: 99 % | DIASTOLIC BLOOD PRESSURE: 81 MMHG | SYSTOLIC BLOOD PRESSURE: 127 MMHG | WEIGHT: 214.5 LBS | TEMPERATURE: 98.5 F | RESPIRATION RATE: 14 BRPM

## 2025-02-20 DIAGNOSIS — S93.492D SPRAIN OF ANTERIOR TALOFIBULAR LIGAMENT OF LEFT ANKLE, SUBSEQUENT ENCOUNTER: Primary | ICD-10-CM

## 2025-02-20 DIAGNOSIS — M32.9 SYSTEMIC LUPUS ERYTHEMATOSUS, UNSPECIFIED SLE TYPE, UNSPECIFIED ORGAN INVOLVEMENT STATUS (MULTI): Primary | ICD-10-CM

## 2025-02-20 DIAGNOSIS — S93.412D SPRAIN OF CALCANEOFIBULAR LIGAMENT OF LEFT ANKLE, SUBSEQUENT ENCOUNTER: ICD-10-CM

## 2025-02-20 PROCEDURE — 3074F SYST BP LT 130 MM HG: CPT | Performed by: INTERNAL MEDICINE

## 2025-02-20 PROCEDURE — 3008F BODY MASS INDEX DOCD: CPT | Performed by: INTERNAL MEDICINE

## 2025-02-20 PROCEDURE — 99214 OFFICE O/P EST MOD 30 MIN: CPT | Performed by: INTERNAL MEDICINE

## 2025-02-20 PROCEDURE — 3079F DIAST BP 80-89 MM HG: CPT | Performed by: INTERNAL MEDICINE

## 2025-02-20 ASSESSMENT — ENCOUNTER SYMPTOMS
OCCASIONAL FEELINGS OF UNSTEADINESS: 0
LOSS OF SENSATION IN FEET: 0
DEPRESSION: 0

## 2025-02-20 NOTE — PROGRESS NOTES
Physical Therapy Treatment    Patient Name: Gladys Norris  MRN: [id number]  Today's Date: 2/20/2025             Current Problem  No diagnosis found.      Subjective   General   Pt stating some stabbing pain lateral ankle after a pretty active day yesterday and today.   Insurance   COPAY 0 DED [id number])   Total Visits to Date 17  Authorization info: NO AUTH REQ  Insurance Type: MMO SUPER MED 60V PT OT ST   Precautions     Pain       Objective   Treatments:  Osman, 5 minutes  HR/TR, 1/2 roll, 20-30 reps  Wobble board F/L, 20-30 reps  Step ups F/L, BOSU, 20 reps  SLS, black foam pad, 20 sec holds, 5 reps  Heel taps F, 4in, 2x10    Desensitization  Assessment   Pt was able to work through some discomfort today with pain going from sharp to normal aching soreness. Discussed nerve sensitivity as potential issue and discussed desensitization techniques. Was a bit more limited today, but is doing well overall.     Plan:  Cont to progress strength and endurance     OP EDUCATION:       Goals:

## 2025-02-20 NOTE — PATIENT INSTRUCTIONS
Check CBC with diff, BMP, ESR, dsDNA, C3, C4, spot urine protein to creatinine ratio.  Please see if Dr. Solis can fax copy of eye exam from 10/24 to [phone].  Follow-up in 4 months.

## 2025-02-20 NOTE — PROGRESS NOTES
Subjective   Patient ID: Gladys Norris is a 59 y.o. female who presents for Follow-up (Follow up/Patient last seen 6/03/2024).    HPI 59 year-old female here for follow-up regarding SLE. (BENITA 1:160, dsDNA 12, malar rash, arthralgias).     Joint pain started in about 2018.  Joint pain has worsened over the last 4 to 5 years.  Joint pain seems worse as the day progresses.  She is stiff for 20 to 45 minutes in the morning.  She currently complains of a lot of knee pain, especially when she goes from a sitting to standing position. Pain is especially severe in her left knee.     X-ray of her left hip done August 1, 2023 shows mild OA.     She started hydroxychloroquine 200 mg twice daily September 2022 for probable SLE.  HCQ does help with pain.    She had surgery on her left ankle September 25, 2024, because of recurrent ankle sprains.  Surgery was done by Dr. Luciano.  Surgery consisted of left ankle debridement and lateral ligament reconstruction.  She has returned to work but is wearing her brace at work and her orthotic.  Pain is still rated as 2 out of 10 in severity.     EMG done March 2023 showed mild to moderate carpal tunnel syndrome and ulnar neuropathy.  Dr. Alvarez is not recommending surgery at present, he told her she will know when she is ready.  She is currently going to sleep with a carpal tunnel splint.     She does have mild dry eyes, thinks her distance vision is not quite as good.  She uses over-the-counter artificial tears frequently. She uses refresh as well as a gel at bedtime.  She had a follow-up eye exam with Dr. Solis 10/24.    She did have a probable flare of SLE November 17, 2023.  She developed a malar rash and widespread joint pain.  Symptoms resolved with taking a Medrol Dosepak.    She sometimes still gets a malar rash.   She complains of mild dry eyes.  1    Labs July 2022: BENITA 1:1 60, double-stranded DNA 12 (positive greater than or equal to 10), SSA and SSB antibodies  "negative, anti-Coleman and RNP antibodies negative, rheumatoid factor negative, Citrulline antibody negative, CRP less than 0.1, CBC normal, CMP normal except glucose 102, TSH 0.45, cholesterol 151, HDL 68, LDL 70, triglycerides 67.  Labs 2022: C3 and C4 normal, anticardiolipin antibody negative, beta-2 glycoprotein antibody negative, urinalysis normal  Labs : dsDNA 9 (5-9 equivocal, positive is greater than or equal to 10), C3 and C4 normal, ESR 13, CBC normal, BMP normal  Labs 2024: CBC normal, BMP normal except glucose 103, ESR 8, double-stranded DNA 8 (5-9 equivocal, positive is greater than equal to 10), C3 and C4 normal, spot urine protein to creatinine ratio 0.09, cholesterol 179, HDL 72, LDL 89, triglyceride 87, liver enzymes normal  Labs 2024: TSH 1.35  1    Medical problem list:   -Hypertension   -Hyperlipidemia   -Hypothyroidism   - SLE    Allergies: Reviewed as documented     Surgeries:    Cholecystectomy   Stapedectomy     Social history: .   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   Denies use of tobacco and alcohol..     Family history: Sister has SLE     ROS:  General: Denies fevers or chills. Has some fatigue.  CV: Denies chest pain or palpitations.  Denies leg edema.  Lungs: Denies coughing or shortness of breath.  Skin: Denies rashes or nodules.  MS: c/o mild residual pain left lateral ankle.    Objective   /81 (BP Location: Left arm, Patient Position: Sitting, BP Cuff Size: Adult)   Pulse 61   Temp 36.9 °C (98.5 °F) (Skin)   Resp 14   Ht 1.753 m (5' 9\")   Wt 97.3 kg (214 lb 8 oz)   SpO2 99%   BMI 31.68 kg/m²     Physical Exam  General Appearance: Well-nourished well-appearing.  HEENT: PERRL, EOMI  Neck: Supple, no nodes.  CV: RRR, no MGR.  Lungs: Clear, no rales or wheezes.  Abdomen: Soft, nontender. No hepatosplenomegaly.  Extremities:  No cyanosis, clubbing, or edema.  MS: No synovitis. Tender 1st CMC bilaterally.  Skin: No rashes or " nodules.    Assessment/Plan   Problem List Items Addressed This Visit             ICD-10-CM    SLE (systemic lupus erythematosus) (Multi) - Primary M32.9    Relevant Orders    CBC and Auto Differential    Basic Metabolic Panel    Anti-DNA Antibody, Double-Stranded    C3 Complement    C4 Complement    Sedimentation Rate    Protein, Urine Random    Creatinine, Urine Random    BMI 31.0-31.9,adult Z68.31         1. SLE- has h/o positive BENITA, positive double-stranded DNA, arthralgias.  Symptoms are currently stable.  She started hydroxychloroquine September 2022, and is having significantly less joint pain.  Follow-up eye exam was done 4/24 with Dr. Solis.    She did have a brief flare November 17, 2023 when she developed a malar rash and generalized arthralgias.  This occurred shortly after doing a colonoscopy prep.  Symptoms resolved with a Medrol Dosepak.     2. BMI 31- stable. She will continue to work on weight loss.     3. Anterior left knee pain-likely patellofemoral OA. Recommend try PT recommend Voltaren gel 4 g 4 times daily as needed..    4.  OA 1st CMC joint bilaterally- injected 5/13/24 by Dr. Cortez, only got 2 days of relief. She will follow up 6/20/24.    5. S/p left ankle surgery September 2024-had left ankle debridement and lateral ligament reconstruction.    Plan:  Check CBC with diff, BMP, ESR, dsDNA, C3, C4, spot urine protein to creatinine ratio.  Please see if Dr. Solis can fax copy of eye exam from 10/24 to [phone].  Follow-up in 4 months.

## 2025-02-20 NOTE — PROGRESS NOTES
Physical Therapy Treatment    Patient Name: Gladys Norris  MRN: [id number]  Today's Date: 2/20/2025                          Insurance:     Assessment:       Plan:       Current Problem  1. Sprain of anterior talofibular ligament of left ankle, subsequent encounter        2. Sprain of calcaneofibular ligament of left ankle, subsequent encounter            Subjective   General     Precautions       Pain       Objective       Treatments:  Therapeutic Exercise (25588):  {PT Treatments:26004}    Manual (86523):    EDUCATION:

## 2025-02-24 ENCOUNTER — APPOINTMENT (OUTPATIENT)
Dept: PHYSICAL THERAPY | Facility: CLINIC | Age: 60
End: 2025-02-24
Payer: COMMERCIAL

## 2025-02-24 DIAGNOSIS — N95.1 MENOPAUSAL SYMPTOM: ICD-10-CM

## 2025-02-24 RX ORDER — ESTRADIOL AND NORETHINDRONE ACETATE .5; .1 MG/1; MG/1
1 TABLET ORAL DAILY
Qty: 84 TABLET | Refills: 0 | OUTPATIENT
Start: 2025-02-24

## 2025-02-26 ENCOUNTER — DOCUMENTATION (OUTPATIENT)
Dept: PHYSICAL THERAPY | Facility: CLINIC | Age: 60
End: 2025-02-26
Payer: COMMERCIAL

## 2025-02-26 NOTE — PROGRESS NOTES
Physical Therapy    Discharge Summary    Name: Gladys Norris  MRN: [id number]  : 1965  Date: 25    Discharge Summary: PT    Discharge Information: Date of discharge 25    Therapy Summary: Progressed    Discharge Status: Stable     Rehab Discharge Reason: Patient requested due to everything is going well and knows how to progress self.

## 2025-02-28 DIAGNOSIS — M77.9 TENDONITIS: ICD-10-CM

## 2025-02-28 RX ORDER — FAMOTIDINE 40 MG/1
40 TABLET, FILM COATED ORAL DAILY
Qty: 90 TABLET | Refills: 3 | Status: SHIPPED | OUTPATIENT
Start: 2025-02-28 | End: 2025-08-27

## 2025-03-03 ENCOUNTER — APPOINTMENT (OUTPATIENT)
Dept: PHYSICAL THERAPY | Facility: CLINIC | Age: 60
End: 2025-03-03
Payer: COMMERCIAL

## 2025-03-04 ENCOUNTER — HOSPITAL ENCOUNTER (OUTPATIENT)
Dept: RADIOLOGY | Facility: HOSPITAL | Age: 60
Discharge: HOME | End: 2025-03-04
Payer: COMMERCIAL

## 2025-03-04 VITALS — WEIGHT: 214.5 LBS | BODY MASS INDEX: 31.77 KG/M2 | HEIGHT: 69 IN

## 2025-03-04 DIAGNOSIS — Z12.31 ENCOUNTER FOR SCREENING MAMMOGRAM FOR MALIGNANT NEOPLASM OF BREAST: ICD-10-CM

## 2025-03-04 PROCEDURE — 77067 SCR MAMMO BI INCL CAD: CPT | Performed by: RADIOLOGY

## 2025-03-04 PROCEDURE — 77067 SCR MAMMO BI INCL CAD: CPT

## 2025-03-04 PROCEDURE — 77063 BREAST TOMOSYNTHESIS BI: CPT | Performed by: RADIOLOGY

## 2025-03-12 DIAGNOSIS — E03.9 HYPOTHYROIDISM, UNSPECIFIED TYPE: ICD-10-CM

## 2025-03-12 RX ORDER — LEVOTHYROXINE SODIUM 200 UG/1
200 TABLET ORAL
Qty: 90 TABLET | Refills: 3 | Status: SHIPPED | OUTPATIENT
Start: 2025-03-12

## 2025-04-04 DIAGNOSIS — I10 BENIGN ESSENTIAL HYPERTENSION: ICD-10-CM

## 2025-04-04 RX ORDER — LOSARTAN POTASSIUM 100 MG/1
TABLET ORAL DAILY
Qty: 45 TABLET | Refills: 3 | Status: SHIPPED | OUTPATIENT
Start: 2025-04-04

## 2025-04-07 ENCOUNTER — APPOINTMENT (OUTPATIENT)
Dept: OBSTETRICS AND GYNECOLOGY | Facility: CLINIC | Age: 60
End: 2025-04-07
Payer: COMMERCIAL

## 2025-04-07 VITALS
HEIGHT: 70 IN | SYSTOLIC BLOOD PRESSURE: 160 MMHG | WEIGHT: 214 LBS | BODY MASS INDEX: 30.64 KG/M2 | DIASTOLIC BLOOD PRESSURE: 80 MMHG

## 2025-04-07 DIAGNOSIS — J30.2 SEASONAL ALLERGIES: Primary | ICD-10-CM

## 2025-04-07 DIAGNOSIS — Z12.31 BREAST CANCER SCREENING BY MAMMOGRAM: ICD-10-CM

## 2025-04-07 DIAGNOSIS — N95.1 MENOPAUSAL SYMPTOM: ICD-10-CM

## 2025-04-07 PROCEDURE — 3008F BODY MASS INDEX DOCD: CPT | Performed by: STUDENT IN AN ORGANIZED HEALTH CARE EDUCATION/TRAINING PROGRAM

## 2025-04-07 PROCEDURE — 99396 PREV VISIT EST AGE 40-64: CPT | Performed by: STUDENT IN AN ORGANIZED HEALTH CARE EDUCATION/TRAINING PROGRAM

## 2025-04-07 PROCEDURE — 1036F TOBACCO NON-USER: CPT | Performed by: STUDENT IN AN ORGANIZED HEALTH CARE EDUCATION/TRAINING PROGRAM

## 2025-04-07 PROCEDURE — 3079F DIAST BP 80-89 MM HG: CPT | Performed by: STUDENT IN AN ORGANIZED HEALTH CARE EDUCATION/TRAINING PROGRAM

## 2025-04-07 PROCEDURE — 3077F SYST BP >= 140 MM HG: CPT | Performed by: STUDENT IN AN ORGANIZED HEALTH CARE EDUCATION/TRAINING PROGRAM

## 2025-04-07 RX ORDER — ESTRADIOL AND NORETHINDRONE ACETATE .5; .1 MG/1; MG/1
1 TABLET ORAL DAILY
Qty: 90 TABLET | Refills: 3 | Status: SHIPPED | OUTPATIENT
Start: 2025-04-07 | End: 2026-04-07

## 2025-04-07 RX ORDER — OLOPATADINE HYDROCHLORIDE 1 MG/ML
1 SOLUTION/ DROPS OPHTHALMIC 2 TIMES DAILY
Qty: 5 ML | Refills: 3 | Status: SHIPPED | OUTPATIENT
Start: 2025-04-07

## 2025-04-07 ASSESSMENT — PAIN SCALES - GENERAL: PAINLEVEL_OUTOF10: 0-NO PAIN

## 2025-04-07 NOTE — PROGRESS NOTES
[facility]  Hand and Upper Extremity Service  Follow up visit         Follow up for: Bilateral hands     Interval History: She returns for her bilateral hands.               Past medical history, medications, allergies, surgical history and review of systems are reviewed and otherwise unchanged when compared to last visit on 6/20/24         Examination:  Constitutional: Oriented to person, place, and time.  Appears well-developed and well-nourished.  Head: Normocephalic and atraumatic.  Eyes: Pupils are equal, round, and reactive to light.  Cardiovascular: Intact distal pulses.  Pulmonary/Chest/Breast: Effort normal. No respiratory distress.  Neurological: Alert and oriented to person, place, and time.  Skin: Skin is warm and dry.  Psychiatric: normal mood and affect.  Behavior is normal.  Musculoskeletal: Bilateral hands reveal       Personal Interpretation of Diagnostic studies:        Impression:       Plan:       In Office Procedures Performed:       Follow up:              Fabio Cortez MD  [facility]  Department of Orthopaedic Surgery  Hand and Upper Extremity Reconstruction    Scribe Attestation  By signing my name below, IMayelin , Scribe   attest that this documentation has been prepared under the direction and in the presence of Dr. Fabio Cortez.    Dictation performed with the use of voice recognition software.  Syntax and grammatical errors may exist.

## 2025-04-07 NOTE — PROGRESS NOTES
"Gladys Norris is a 60 y.o.  female who is here for a routine exam.   PCP = Zaheer Buchanan,     Subjective     Concerns today: EPRT refill.     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  Family history (specifically breast, ovarian, colon cancer) reviewed and updated in chart.   Personal medical and surgical history reviewed and updated in chart.   Obstetric & Gynecologic history reviewed and updated in chart.  Pap history reviewed and updated in chart.         Objective   /80   Ht 1.778 m (5' 10\")   Wt 97.1 kg (214 lb)   BMI 30.71 kg/m²   Physical Exam  Vitals reviewed. Exam conducted with a chaperone present.   Constitutional:       Appearance: Normal appearance.   HENT:      Head: Normocephalic.   Cardiovascular:      Rate and Rhythm: Normal rate and regular rhythm.   Pulmonary:      Effort: Pulmonary effort is normal. No respiratory distress.   Chest:   Breasts:     Right: Normal. No swelling, mass or skin change.      Left: Normal. No swelling, mass or skin change.   Abdominal:      General: There is no distension.      Palpations: Abdomen is soft. There is no mass.      Tenderness: There is no abdominal tenderness. There is no guarding or rebound.   Genitourinary:     Comments: Normal appearing external female genitalia. No vulvar lesions.   Lymphadenopathy:      Upper Body:      Right upper body: No supraclavicular, axillary or pectoral adenopathy.      Left upper body: No supraclavicular, axillary or pectoral adenopathy. "   Skin:     General: Skin is warm and dry.   Neurological:      General: No focal deficit present.      Mental Status: She is alert.   Psychiatric:         Mood and Affect: Mood normal.         Behavior: Behavior normal.         Thought Content: Thought content normal.         Judgment: Judgment normal.             Assessment/Plan      Gladys Norris is a 60 y.o.  female presenting today for annual exam with the following problems addressed:    Problem List Items Addressed This Visit       Seasonal allergies - Primary    Relevant Medications    olopatadine (Patanol) 0.1 % ophthalmic solution    Menopausal symptom     Continue EPRT. Refill sent.         Relevant Medications    estradiol-norethindrone acet 0.5-0.1 mg tablet     Other Visit Diagnoses       Breast cancer screening by mammogram                Healthcare Maintenance:  - Reviewed healthy lifestyle including well rounded diet and exercise (moderate intensity 150min/week; high intensity 75min/week)  - Immunizations: UTD  - Pap UTD  - Mammogram ordered  - STI testing declined  - RTC for annual

## 2025-04-10 ENCOUNTER — APPOINTMENT (OUTPATIENT)
Dept: ORTHOPEDIC SURGERY | Facility: CLINIC | Age: 60
End: 2025-04-10
Payer: COMMERCIAL

## 2025-04-17 ENCOUNTER — PATIENT MESSAGE (OUTPATIENT)
Dept: PRIMARY CARE | Facility: CLINIC | Age: 60
End: 2025-04-17
Payer: COMMERCIAL

## 2025-04-17 DIAGNOSIS — R22.42 LOCALIZED SWELLING OF LEFT LOWER EXTREMITY: ICD-10-CM

## 2025-04-17 DIAGNOSIS — J45.909 REACTIVE AIRWAY DISEASE WITHOUT COMPLICATION, UNSPECIFIED ASTHMA SEVERITY, UNSPECIFIED WHETHER PERSISTENT (HHS-HCC): Primary | ICD-10-CM

## 2025-04-18 ENCOUNTER — OFFICE VISIT (OUTPATIENT)
Dept: PRIMARY CARE | Facility: CLINIC | Age: 60
End: 2025-04-18
Payer: COMMERCIAL

## 2025-04-18 ENCOUNTER — APPOINTMENT (OUTPATIENT)
Dept: URGENT CARE | Age: 60
End: 2025-04-18
Payer: COMMERCIAL

## 2025-04-18 VITALS
HEART RATE: 67 BPM | RESPIRATION RATE: 16 BRPM | TEMPERATURE: 97 F | WEIGHT: 211 LBS | BODY MASS INDEX: 30.28 KG/M2 | DIASTOLIC BLOOD PRESSURE: 70 MMHG | OXYGEN SATURATION: 99 % | SYSTOLIC BLOOD PRESSURE: 138 MMHG

## 2025-04-18 DIAGNOSIS — R05.9 COUGH, UNSPECIFIED TYPE: Primary | ICD-10-CM

## 2025-04-18 PROCEDURE — 3075F SYST BP GE 130 - 139MM HG: CPT | Performed by: INTERNAL MEDICINE

## 2025-04-18 PROCEDURE — 1036F TOBACCO NON-USER: CPT | Performed by: INTERNAL MEDICINE

## 2025-04-18 PROCEDURE — 3078F DIAST BP <80 MM HG: CPT | Performed by: INTERNAL MEDICINE

## 2025-04-18 PROCEDURE — 99213 OFFICE O/P EST LOW 20 MIN: CPT | Performed by: INTERNAL MEDICINE

## 2025-04-18 RX ORDER — CETIRIZINE HYDROCHLORIDE 10 MG/1
10 TABLET ORAL DAILY
COMMUNITY

## 2025-04-18 RX ORDER — PREDNISONE 5 MG/1
TABLET ORAL
Qty: 30 TABLET | Refills: 0 | Status: SHIPPED | OUTPATIENT
Start: 2025-04-18

## 2025-04-18 RX ORDER — FLUTICASONE FUROATE 27.5 UG/1
2 SPRAY, METERED NASAL
COMMUNITY

## 2025-04-18 RX ORDER — CEFUROXIME AXETIL 500 MG/1
500 TABLET ORAL 2 TIMES DAILY
Qty: 20 TABLET | Refills: 0 | Status: SHIPPED | OUTPATIENT
Start: 2025-04-18 | End: 2025-04-28

## 2025-04-18 RX ORDER — FLUCONAZOLE 150 MG/1
150 TABLET ORAL ONCE
Qty: 1 TABLET | Refills: 1 | Status: SHIPPED | OUTPATIENT
Start: 2025-04-18 | End: 2025-04-18

## 2025-04-18 ASSESSMENT — ENCOUNTER SYMPTOMS
COUGH: 1
SINUS PRESSURE: 1

## 2025-04-18 NOTE — PROGRESS NOTES
"Patient here for a URI   Discuss eye drops    Subjective   Patient ID: Gladys Norris is a 60 y.o. female who presents for URI.    The patient reports sinus and nasal congestion as well as a productive cough since around 3/28/2025.  She has been using an albuterol nebulizer which helps intermittently despite experiencing \"jitteriness\" with the medication.  The patient is also taking Flonase as well as Zyrtec for suspected allergy symptoms.  She recalls experiencing nausea with doxycycline in the past.    URI   Associated symptoms include congestion and coughing.     Review of Systems   HENT:  Positive for congestion and sinus pressure.    Respiratory:  Positive for cough.      Objective   Physical Exam  Constitutional:       Appearance: Normal appearance.   Neck:      Vascular: No carotid bruit.   Cardiovascular:      Rate and Rhythm: Normal rate and regular rhythm.      Heart sounds: Normal heart sounds.   Pulmonary:      Effort: Pulmonary effort is normal.      Breath sounds: Normal breath sounds.   Abdominal:      General: Bowel sounds are normal.      Palpations: Abdomen is soft.      Tenderness: There is no abdominal tenderness.   Skin:     General: Skin is warm and dry.   Neurological:      General: No focal deficit present.      Mental Status: She is alert and oriented to person, place, and time. Mental status is at baseline.   Psychiatric:         Mood and Affect: Mood normal.         Behavior: Behavior normal.       Assessment/Plan   Problem List Items Addressed This Visit    None  Visit Diagnoses         Codes      Cough, unspecified type    -  Primary R05.9    Relevant Medications    fluconazole (Diflucan) 150 mg tablet    predniSONE (Deltasone) 5 mg tablet    cefuroxime (Ceftin) 500 mg tablet            IMPRESSION/PLAN :        Cough  - Prescribed cefuroxime 500mg BID as this has worked well in the past.  Discussed adverse effect profile.   Added on fluconazole 150mg single dose for prophylaxis, and " encouraged patient to start probiotic or eat yogurt.  Also prescribed prednisone 5mg to be taken as directed on packaging.  Advised patient to start taking Xyzal OTC at night time, and continue with Flonase.  Drink plenty of water to remain well hydrated, and rest as much as possible.  Call the clinic if symptoms persist or worsen.     HTN   - /70 in office today, continue on Losartan 100mg 1/2 tab daily.      HLD   - Stable, continue on atorvastatin 10mg QD.  Previously ordered CT Cardiac Score to establish cardiac risk.     Hypothyroid   - Last TSH wnl - 10/2024.  Maintained with levothyroxine to 200 mcg every day.    Hyperglycemia / Prediabetes  - Last HbA1c 5.7% per 8/2023,  mg/dL per 5/18/2023.       Left Foot Fracture  - MR 7/2024 showed focal marrow edema at the undersurface of the talar head may represent contusion versus stress reaction. No discrete fracture line seen, remote avulsion is at the base of the 5th metatarsal, thickening of the anterior talofibular and of the calcaneofibular, ligament suggestive of a remote injury. Following  from Orthopedic Surgery.     Carpal Tunnel Syndrome   - I have advised the patient to try to wear a carpal tunnel wrist splint to see if this helps with her symptom.      SLE   - Labs showed a weakly positive BENITA but Rheumatoid Factor, CRP, and citrulline antibody were unremarkable and ESR was cancelled by the lab on 7/2022. Started on hydroxychloroquine 200 mg BID on 10/3/2022, and experienced nausea w/o vomiting 1 week later. Follows up with  in Rheumatology.     Bilateral CTS  - Following with  from [facility]   - Routine labs 3/2025, 7/2024. Last Pap wnl 7/2022. Last Mammogram 3/2025. Last colonoscopy performed 11/2023, repeat due 11/2028.     Follow up in 6 months, call sooner if needed.        Scribe Attestation  By signing my name below, IIbeth, Scribe   attest that this documentation  has been prepared under the direction and in the presence of Zaheer Buchanan DO.   Ibeth Carrera 04/18/25 11:06 AM

## 2025-04-21 NOTE — PROGRESS NOTES
[facility]  Hand and Upper Extremity Service  Follow up visit         Follow up for: Bilateral hands     Interval History: She returns for her bilateral hands.               Past medical history, medications, allergies, surgical history and review of systems are reviewed and otherwise unchanged when compared to last visit on 6/20/24         Examination:  Constitutional: Oriented to person, place, and time.  Appears well-developed and well-nourished.  Head: Normocephalic and atraumatic.  Eyes: Pupils are equal, round, and reactive to light.  Cardiovascular: Intact distal pulses.  Pulmonary/Chest/Breast: Effort normal. No respiratory distress.  Neurological: Alert and oriented to person, place, and time.  Skin: Skin is warm and dry.  Psychiatric: normal mood and affect.  Behavior is normal.  Musculoskeletal: Bilateral hands reveal       Personal Interpretation of Diagnostic studies:        Impression:       Plan:       In Office Procedures Performed:       Follow up:              Fabio Cortez MD  [facility]  Department of Orthopaedic Surgery  Hand and Upper Extremity Reconstruction    Scribe Attestation  By signing my name below, IMayelin , Scribe   attest that this documentation has been prepared under the direction and in the presence of Dr. Fabio Cortez.    Dictation performed with the use of voice recognition software.  Syntax and grammatical errors may exist.

## 2025-04-22 RX ORDER — BUDESONIDE AND FORMOTEROL FUMARATE DIHYDRATE 80; 4.5 UG/1; UG/1
2 AEROSOL RESPIRATORY (INHALATION)
Qty: 10.2 G | Refills: 5 | Status: SHIPPED | OUTPATIENT
Start: 2025-04-22 | End: 2026-04-22

## 2025-04-22 RX ORDER — ALBUTEROL SULFATE 90 UG/1
2 INHALANT RESPIRATORY (INHALATION) EVERY 4 HOURS PRN
Qty: 8.5 G | Refills: 5 | Status: SHIPPED | OUTPATIENT
Start: 2025-04-22 | End: 2026-04-22

## 2025-04-25 ENCOUNTER — HOSPITAL ENCOUNTER (OUTPATIENT)
Dept: VASCULAR MEDICINE | Facility: CLINIC | Age: 60
Discharge: HOME | End: 2025-04-25
Payer: COMMERCIAL

## 2025-04-25 DIAGNOSIS — R60.0 LOCALIZED EDEMA: ICD-10-CM

## 2025-04-25 DIAGNOSIS — R22.42 LOCALIZED SWELLING OF LEFT LOWER EXTREMITY: ICD-10-CM

## 2025-04-25 PROCEDURE — 93971 EXTREMITY STUDY: CPT

## 2025-04-25 NOTE — TELEPHONE ENCOUNTER
Can we please touch base with her?  I'd like her to get an ultrasound this morning to check out that leg.  I entered an STAT order.  Can you please set up?

## 2025-05-01 ENCOUNTER — APPOINTMENT (OUTPATIENT)
Dept: ORTHOPEDIC SURGERY | Facility: CLINIC | Age: 60
End: 2025-05-01
Payer: COMMERCIAL

## 2025-05-01 ENCOUNTER — APPOINTMENT (OUTPATIENT)
Facility: CLINIC | Age: 60
End: 2025-05-01
Payer: COMMERCIAL

## 2025-05-14 DIAGNOSIS — J45.909 REACTIVE AIRWAY DISEASE WITHOUT COMPLICATION, UNSPECIFIED ASTHMA SEVERITY, UNSPECIFIED WHETHER PERSISTENT (HHS-HCC): ICD-10-CM

## 2025-05-14 RX ORDER — BUDESONIDE AND FORMOTEROL FUMARATE DIHYDRATE 80; 4.5 UG/1; UG/1
2 AEROSOL RESPIRATORY (INHALATION)
Qty: 10.2 G | Refills: 5 | Status: SHIPPED | OUTPATIENT
Start: 2025-05-14 | End: 2026-05-14

## 2025-07-03 DIAGNOSIS — M32.9 SYSTEMIC LUPUS ERYTHEMATOSUS, UNSPECIFIED SLE TYPE, UNSPECIFIED ORGAN INVOLVEMENT STATUS (MULTI): ICD-10-CM

## 2025-07-03 RX ORDER — HYDROXYCHLOROQUINE SULFATE 200 MG/1
TABLET, FILM COATED ORAL 2 TIMES DAILY
Qty: 180 TABLET | Refills: 1 | Status: SHIPPED | OUTPATIENT
Start: 2025-07-03

## 2025-07-15 NOTE — PROGRESS NOTES
[facility]  Hand and Upper Extremity Service  Follow up visit         Follow up for: Bilateral hands     Interval History: She returns for bilateral hand pain and numbness. She was last seen and diagnosed with right thumb CMC arthritis and right carpal tunnel syndrome and was offered injections but she declined at the time. She preferred conservative management up until now. She presents for worsening of her bilateral basal joint pain with pinch, grasp, and squeeze maneuvers. Symptoms are worse at the end of a work shift or the end of the day. She has been using topical Voltaren and activity modifications. She has a brace but cannot wear it at work due to the frequency of hand washing. She denies any significant numbness or tingling and reports she will occasionally waken form the numbness and tingling but this is fairly infrequent.               Past medical history, medications, allergies, surgical history and review of systems are reviewed and otherwise unchanged when compared to last visit on 6/20/24         Examination:  Constitutional: Oriented to person, place, and time.  Appears well-developed and well-nourished.  Head: Normocephalic and atraumatic.  Eyes: Pupils are equal, round, and reactive to light.  Cardiovascular: Intact distal pulses.  Pulmonary/Chest/Breast: Effort normal. No respiratory distress.  Neurological: Alert and oriented to person, place, and time.  Skin: Skin is warm and dry.  Psychiatric: normal mood and affect.  Behavior is normal.  Musculoskeletal: Bilateral hands reveal markedly positive thumb CMC grind test with slight crepitus. No thenar atrophy. No MP joint hyperextension instability. Full finger flexion and sensation subjectively normal.        Impression: Bilateral thumb CMC arthritis       Plan: We have recommended continued use of activity modifications, oral anti-inflammatory medications, and topical analgesics. We discussed the benefit of  heat and she has requested injections so we gave her bilateral thumb CMC injections today. She will return as needed for symptom recurrence.       In Office Procedures Performed: Bilateral thumb CMC injections   S Inj/Asp: bilateral thumb CMC on 7/17/2025 8:39 AM  Indications: pain  Details: 25 G needle, dorsal approach  Medications (Right): 20 mg triamcinolone acetonide 40 mg/mL; 0.5 mL lidocaine 10 mg/mL (1 %)  Medications (Left): 20 mg triamcinolone acetonide 40 mg/mL; 0.5 mL lidocaine 10 mg/mL (1 %)  Outcome: tolerated well, no immediate complications  Procedure, treatment alternatives, risks and benefits explained, specific risks discussed. Consent was given by the patient. Immediately prior to procedure a time out was called to verify the correct patient, procedure, equipment, support staff and site/side marked as required. Patient was prepped and draped in the usual sterile fashion.       Follow up: As needed             Fabio Cortez MD  [facility]  Department of Orthopaedic Surgery  Hand and Upper Extremity Reconstruction    Scribe Attestation  By signing my name below, IMayelin , Scribtameka   attest that this documentation has been prepared under the direction and in the presence of Dr. Fabio Cortez.    Dictation performed with the use of voice recognition software.  Syntax and grammatical errors may exist.

## 2025-07-17 ENCOUNTER — APPOINTMENT (OUTPATIENT)
Dept: ORTHOPEDIC SURGERY | Facility: CLINIC | Age: 60
End: 2025-07-17
Payer: COMMERCIAL

## 2025-07-17 VITALS — WEIGHT: 211 LBS | HEIGHT: 70 IN | BODY MASS INDEX: 30.21 KG/M2

## 2025-07-17 DIAGNOSIS — M18.0 ARTHRITIS OF CARPOMETACARPAL (CMC) JOINT OF BOTH THUMBS: Primary | ICD-10-CM

## 2025-07-17 PROCEDURE — 99212 OFFICE O/P EST SF 10 MIN: CPT | Mod: 25 | Performed by: ORTHOPAEDIC SURGERY

## 2025-07-17 PROCEDURE — 1036F TOBACCO NON-USER: CPT | Performed by: ORTHOPAEDIC SURGERY

## 2025-07-17 PROCEDURE — 2500000004 HC RX 250 GENERAL PHARMACY W/ HCPCS (ALT 636 FOR OP/ED): Performed by: ORTHOPAEDIC SURGERY

## 2025-07-17 PROCEDURE — 99213 OFFICE O/P EST LOW 20 MIN: CPT | Performed by: ORTHOPAEDIC SURGERY

## 2025-07-17 PROCEDURE — 3008F BODY MASS INDEX DOCD: CPT | Performed by: ORTHOPAEDIC SURGERY

## 2025-07-17 PROCEDURE — 20600 DRAIN/INJ JOINT/BURSA W/O US: CPT | Mod: 50 | Performed by: ORTHOPAEDIC SURGERY

## 2025-07-17 RX ORDER — TRIAMCINOLONE ACETONIDE 40 MG/ML
20 INJECTION, SUSPENSION INTRA-ARTICULAR; INTRAMUSCULAR
Status: COMPLETED | OUTPATIENT
Start: 2025-07-17 | End: 2025-07-17

## 2025-07-17 RX ORDER — LIDOCAINE HYDROCHLORIDE 10 MG/ML
0.5 INJECTION, SOLUTION INFILTRATION; PERINEURAL
Status: COMPLETED | OUTPATIENT
Start: 2025-07-17 | End: 2025-07-17

## 2025-07-17 RX ADMIN — LIDOCAINE HYDROCHLORIDE 0.5 ML: 10 INJECTION, SOLUTION INFILTRATION; PERINEURAL at 08:39

## 2025-07-17 RX ADMIN — TRIAMCINOLONE ACETONIDE 20 MG: 40 INJECTION, SUSPENSION INTRA-ARTICULAR; INTRAMUSCULAR at 08:39

## 2025-07-24 ENCOUNTER — APPOINTMENT (OUTPATIENT)
Dept: RHEUMATOLOGY | Facility: CLINIC | Age: 60
End: 2025-07-24
Payer: COMMERCIAL

## 2025-07-24 VITALS
DIASTOLIC BLOOD PRESSURE: 64 MMHG | TEMPERATURE: 97.1 F | HEIGHT: 70 IN | OXYGEN SATURATION: 99 % | SYSTOLIC BLOOD PRESSURE: 132 MMHG | HEART RATE: 70 BPM | BODY MASS INDEX: 30.28 KG/M2 | RESPIRATION RATE: 14 BRPM

## 2025-07-24 DIAGNOSIS — M32.9 SYSTEMIC LUPUS ERYTHEMATOSUS, UNSPECIFIED SLE TYPE, UNSPECIFIED ORGAN INVOLVEMENT STATUS (MULTI): Primary | ICD-10-CM

## 2025-07-24 PROCEDURE — 3075F SYST BP GE 130 - 139MM HG: CPT | Performed by: INTERNAL MEDICINE

## 2025-07-24 PROCEDURE — 99214 OFFICE O/P EST MOD 30 MIN: CPT | Performed by: INTERNAL MEDICINE

## 2025-07-24 PROCEDURE — 3078F DIAST BP <80 MM HG: CPT | Performed by: INTERNAL MEDICINE

## 2025-07-24 RX ORDER — LEVOCETIRIZINE DIHYDROCHLORIDE 5 MG/1
TABLET, FILM COATED ORAL EVERY EVENING
COMMUNITY

## 2025-07-24 ASSESSMENT — PAIN SCALES - GENERAL: PAINLEVEL_OUTOF10: 5

## 2025-07-24 NOTE — PROGRESS NOTES
Subjective   Patient ID: Gladys Norris is a 60 y.o. female who presents for Follow-up.    HPI 60 year-old female here for follow-up regarding SLE. (BENITA 1:160, dsDNA 12, malar rash, arthralgias).     Joint pain started in about 2018.  Joint pain has worsened over the last 4 to 5 years.  Joint pain seems worse as the day progresses.  She is stiff for 20 to 45 minutes in the morning.  She currently complains of a lot of knee pain, especially when she goes from a sitting to standing position. Pain is especially severe in her left knee.     X-ray of her left hip done August 1, 2023 shows mild OA.     She started hydroxychloroquine 200 mg twice daily September 2022 for probable SLE.  HCQ does help with pain.    She c/o right buttock pain for the last 2 days. Symptoms are worse with standing and walking. She denies groin pain.     She had injection of 1st CMC bilaterally 7/17/25 by Dr. Cortez.     She had surgery on her left ankle September 25, 2024, because of recurrent ankle sprains.  Surgery was done by Dr. Luciano.  Surgery consisted of left ankle debridement and lateral ligament reconstruction.  She has returned to work but is wearing her brace at work and her orthotic.  Pain is still rated as 2 out of 10 in severity.     She does have mild dry eyes, thinks her distance vision is not quite as good.  She uses over-the-counter artificial tears frequently. She uses refresh as well as a gel at bedtime.  She had a follow-up eye exam with Dr. Solis 10/24 and 4/25 (includes OCT).  10-2 VF is scheduled for next month.    She sometimes still gets a malar rash.   She complains of mild dry eyes.  1    Labs July 2022: BENITA 1:1 60, double-stranded DNA 12 (positive greater than or equal to 10), SSA and SSB antibodies negative, anti-Coleman and RNP antibodies negative, rheumatoid factor negative, Citrulline antibody negative, CRP less than 0.1, CBC normal, CMP normal except glucose 102, TSH 0.45, cholesterol 151, HDL 68, LDL 70,  "triglycerides 67.  Labs 2022: C3 and C4 normal, anticardiolipin antibody negative, beta-2 glycoprotein antibody negative, urinalysis normal  Labs : dsDNA 9 (5-9 equivocal, positive is greater than or equal to 10), C3 and C4 normal, ESR 13, CBC normal, BMP normal  Labs 2024: CBC normal, BMP normal except glucose 103, ESR 8, double-stranded DNA 8 (5-9 equivocal, positive is greater than equal to 10), C3 and C4 normal, spot urine protein to creatinine ratio 0.09, cholesterol 179, HDL 72, LDL 89, triglyceride 87, liver enzymes normal  Labs 2024: TSH 1.35  Labs 3/25: CBC normal, BMP normal, spot urine protein to creatinine ratio 0.09, dsDNA 223 (201-300 equivocal, 301-800 moderate positive), C3 and C4 normal  1    Medical problem list:   -Hypertension   -Hyperlipidemia   -Hypothyroidism   - SLE    Allergies: Reviewed as documented     Surgeries:    Cholecystectomy   Stapedectomy     Social history: .   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   Denies use of tobacco and alcohol..     Family history: Sister has SLE     ROS:  General: Denies fevers or chills. Has some fatigue.  CV: Denies chest pain or palpitations.  Denies leg edema.  Lungs: Denies coughing or shortness of breath.  Skin: Denies rashes or nodules.  MS: c/o right buttock pain x 2 days.    Objective   /64   Pulse 70   Temp 36.2 °C (97.1 °F)   Resp 14   Ht 1.778 m (5' 10\")   SpO2 99%   BMI 30.28 kg/m²     Physical Exam  General Appearance: Well-nourished well-appearing.  HEENT: PERRL, EOMI  Neck: Supple, no nodes.  CV: RRR, no MGR.  Lungs: Clear, no rales or wheezes.  Abdomen: Soft, nontender. No hepatosplenomegaly.  Extremities:  No cyanosis, clubbing, or edema.  MS: No synovitis. No pain with internal or external rotation right hip. No vertebral body tenderness.  Skin: No rashes or nodules.    Assessment/Plan   Problem List Items Addressed This Visit           ICD-10-CM    SLE (systemic lupus " erythematosus) (Multi) - Primary M32.9    Relevant Orders    CBC and Auto Differential    Basic Metabolic Panel    Sedimentation Rate    Protein, Urine Random    Creatinine, Urine Random       1. SLE- has h/o positive BENITA, positive double-stranded DNA, arthralgias.  Symptoms are currently stable.  She started hydroxychloroquine September 2022, and is having significantly less joint pain.  Follow-up eye exam was done 4/24 (includes OCT) with Dr. Solis. Next is scheduled for next week.     She did have a brief flare November 17, 2023 when she developed a malar rash and generalized arthralgias.  This occurred shortly after doing a colonoscopy prep.  Symptoms resolved with a Medrol Dosepak.     2. BMI 30- stable. She will continue to work on weight loss.     3.  OA 1st CMC joint bilaterally- injected 7/25 by Dr. Cortez, only got 2 days of relief. She will follow up 6/20/24.    4. S/p left ankle surgery September 2024-had left ankle debridement and lateral ligament reconstruction.    Plan:  Continue same medication.  Check labs: CBC with diff, BMP, ESR, spot urine protein to creatinine ratio.  Follow-up in 4 months.

## 2025-07-27 ASSESSMENT — PROMIS GLOBAL HEALTH SCALE
RATE_MENTAL_HEALTH: EXCELLENT
EMOTIONAL_PROBLEMS: NEVER
RATE_GENERAL_HEALTH: EXCELLENT
RATE_PHYSICAL_HEALTH: VERY GOOD
RATE_SOCIAL_SATISFACTION: EXCELLENT
CARRYOUT_SOCIAL_ACTIVITIES: EXCELLENT
CARRYOUT_PHYSICAL_ACTIVITIES: MOSTLY
RATE_AVERAGE_PAIN: 3
RATE_AVERAGE_FATIGUE: MILD
RATE_QUALITY_OF_LIFE: EXCELLENT

## 2025-07-28 ENCOUNTER — APPOINTMENT (OUTPATIENT)
Dept: PRIMARY CARE | Facility: CLINIC | Age: 60
End: 2025-07-28
Payer: COMMERCIAL

## 2025-07-28 VITALS
SYSTOLIC BLOOD PRESSURE: 128 MMHG | OXYGEN SATURATION: 98 % | WEIGHT: 211 LBS | RESPIRATION RATE: 16 BRPM | HEIGHT: 69 IN | BODY MASS INDEX: 31.25 KG/M2 | DIASTOLIC BLOOD PRESSURE: 70 MMHG | HEART RATE: 62 BPM

## 2025-07-28 DIAGNOSIS — Z00.00 HEALTHCARE MAINTENANCE: Primary | ICD-10-CM

## 2025-07-28 DIAGNOSIS — M54.30 SCIATICA, UNSPECIFIED LATERALITY: ICD-10-CM

## 2025-07-28 DIAGNOSIS — H04.123 DRY EYES: ICD-10-CM

## 2025-07-28 PROCEDURE — 3074F SYST BP LT 130 MM HG: CPT | Performed by: INTERNAL MEDICINE

## 2025-07-28 PROCEDURE — 3078F DIAST BP <80 MM HG: CPT | Performed by: INTERNAL MEDICINE

## 2025-07-28 PROCEDURE — 93000 ELECTROCARDIOGRAM COMPLETE: CPT | Performed by: INTERNAL MEDICINE

## 2025-07-28 PROCEDURE — 3008F BODY MASS INDEX DOCD: CPT | Performed by: INTERNAL MEDICINE

## 2025-07-28 PROCEDURE — 99396 PREV VISIT EST AGE 40-64: CPT | Performed by: INTERNAL MEDICINE

## 2025-07-28 PROCEDURE — 1036F TOBACCO NON-USER: CPT | Performed by: INTERNAL MEDICINE

## 2025-07-28 RX ORDER — PREDNISONE 5 MG/1
TABLET ORAL
Qty: 30 TABLET | Refills: 0 | Status: SHIPPED | OUTPATIENT
Start: 2025-07-28

## 2025-07-28 RX ORDER — OLOPATADINE HYDROCHLORIDE 2 MG/ML
1 SOLUTION OPHTHALMIC DAILY
Qty: 5 ML | Refills: 1 | Status: SHIPPED | OUTPATIENT
Start: 2025-07-28

## 2025-07-28 ASSESSMENT — ENCOUNTER SYMPTOMS
ABDOMINAL PAIN: 0
DIARRHEA: 0
NECK PAIN: 1
CONSTIPATION: 1

## 2025-07-28 NOTE — PROGRESS NOTES
Patient here for a physical     Subjective   Patient ID: Gladys Norris is a 60 y.o. female who presents for Annual Exam.    The patient reports a flare-up of right-sided sciatica and neck pain which have somewhat improved with stretching exercises.  Overall, she finds that increased physical activity has resulted in reduced stiffness.  The patient continues to follow with  from Rheumatology for a history of SLE.  She notes that the condition is otherwise stable.  The patient would like to avoid gabapentin as a family member had a negative reaction to the medication.     The patient denies any significant vision changes and completes regular eye exams.  She is currently taking eye drops for dry eyes which are tolerated well.    The patient is scheduled for an upcoming appointment with  from Podiatry.    The patient notes mild constipation despite being well hydrated with water and Gatorade.  She is not taking fiber supplementation as often as she would like.  The patient otherwise denies abdominal pain or bowel problems.      Review of Systems   Eyes:  Negative for visual disturbance.   Gastrointestinal:  Positive for constipation. Negative for abdominal pain and diarrhea.   Musculoskeletal:  Positive for neck pain.        Positive for right-sided sciatica.   All other systems reviewed and are negative.      Objective   Physical Exam  Constitutional:       Appearance: Normal appearance.   Neck:      Vascular: No carotid bruit.     Cardiovascular:      Rate and Rhythm: Normal rate and regular rhythm.      Heart sounds: Normal heart sounds.   Pulmonary:      Effort: Pulmonary effort is normal.      Breath sounds: Normal breath sounds.   Abdominal:      General: Bowel sounds are normal.      Palpations: Abdomen is soft.      Tenderness: There is no abdominal tenderness.     Skin:     General: Skin is warm and dry.     Neurological:      General: No focal deficit present.      Mental Status:  She is alert and oriented to person, place, and time. Mental status is at baseline.     Psychiatric:         Mood and Affect: Mood normal.         Behavior: Behavior normal.         Assessment/Plan   Problem List Items Addressed This Visit    None  Visit Diagnoses         Codes      Healthcare maintenance    -  Primary Z00.00    Relevant Orders    ECG 12 lead (Clinic Performed) (Completed)    Lipid panel    Tsh With Reflex To Free T4 If Abnormal    Hepatic function panel    Hemoglobin A1c      Sciatica, unspecified laterality     M54.30    Relevant Medications    predniSONE (Deltasone) 5 mg tablet      Dry eyes     H04.123    Relevant Medications    olopatadine (Pataday Once Daily Relief) 0.2 % ophthalmic solution            CPE Performed  -  Discussed healthy diet and regular exercise.    -  Physical exam overall unremarkable. Immunizations reviewed and updated accordingly. Healthy lifestyle choices discussed (tobacco avoidance, appropriate alcohol use, avoidance of illicit substances).   -  Patient is wearing seatbelt.   -  Screening lab work ordered as indicated.    -  Age appropriate screening tests reviewed with patient.        EKG unremarkable as compared to previous.    IMPRESSION/PLAN:        Right-Sided Sciatica  - Prescribed prednisone 5mg taper to be taken as directed on packaging.  Patient would like to avoid gabapentin.  Call the clinic if symptoms persist or worsen.     Dry Eyes  - Refilled olopatadine (Pataday Once Daily Relief) 0.2% Ophthalmic solution as 1 drop in each eye once daily.  Call the clinic if symptoms persist or worsen.     HTN   - /70 in office today, continue on Losartan 100mg 1/2 tab daily.      HLD   - Stable, continue on atorvastatin 10mg QD.  Previously ordered CT Cardiac Score to establish cardiac risk.     Hypothyroid   - Last TSH wnl - 10/2024.  Maintained with levothyroxine to 200 mcg every day.     Hyperglycemia / Prediabetes  - Last HbA1c 5.7% per 8/2023,  mg/dL  "per 5/18/2023.       [facility]  - Following with  form Obstetrics/Gynecology.     SLE   - Labs showed a weakly positive BENITA but Rheumatoid Factor, CRP, and citrulline antibody were unremarkable and ESR was cancelled by the lab on 7/2022. Started on hydroxychloroquine 200 mg BID on 10/3/2022, and experienced nausea w/o vomiting 1 week later. Follows up with  in Rheumatology.     Bilateral CTS  - Following with  from Orthopedics Hand     [facility] Maintenance   - Routine labs ordered including HFP and a lipid panel to be completed in the fasting state. Added TSH, HbA1c. Last Pap wnl 7/2022. Last Mammogram 3/2025, repeat pending for 2026. Last colonoscopy performed 11/2023, repeat due 11/2028.     Follow up in 12 months, call sooner if needed.        \"I, Dr. Buchanan, personally performed the services described in the documentation as scribed by Ibeth Carrera in my presence, and confirm it is both accurate and complete.   Scribe Attestation     Scribe Attestation  By signing my name below, I, Ibeth Carrera, Scribe   attest that this documentation has been prepared under the direction and in the presence of Zaheer Buchanan DO.   Ibeth Carrera 07/28/25 8:14 AM   "

## 2025-08-18 ENCOUNTER — APPOINTMENT (OUTPATIENT)
Dept: ALLERGY | Facility: CLINIC | Age: 60
End: 2025-08-18
Payer: COMMERCIAL

## 2025-08-28 ENCOUNTER — APPOINTMENT (OUTPATIENT)
Dept: ALLERGY | Facility: CLINIC | Age: 60
End: 2025-08-28
Payer: COMMERCIAL

## 2025-09-04 DIAGNOSIS — E78.2 MIXED HYPERLIPIDEMIA: ICD-10-CM

## 2025-09-04 RX ORDER — ATORVASTATIN CALCIUM 10 MG/1
10 TABLET, FILM COATED ORAL DAILY
Qty: 90 TABLET | Refills: 3 | Status: SHIPPED | OUTPATIENT
Start: 2025-09-04

## 2025-11-25 ENCOUNTER — APPOINTMENT (OUTPATIENT)
Dept: PODIATRY | Facility: CLINIC | Age: 60
End: 2025-11-25
Payer: COMMERCIAL

## 2025-12-02 ENCOUNTER — APPOINTMENT (OUTPATIENT)
Dept: RHEUMATOLOGY | Facility: CLINIC | Age: 60
End: 2025-12-02
Payer: COMMERCIAL

## 2026-03-14 ENCOUNTER — APPOINTMENT (OUTPATIENT)
Dept: RADIOLOGY | Facility: HOSPITAL | Age: 61
End: 2026-03-14
Payer: COMMERCIAL

## 2026-07-28 ENCOUNTER — APPOINTMENT (OUTPATIENT)
Dept: PRIMARY CARE | Facility: CLINIC | Age: 61
End: 2026-07-28
Payer: COMMERCIAL

## (undated) DEVICE — TUBING, SUCTION, CONNECTING, STERILE 0.25 X 120 IN., LF

## (undated) DEVICE — PADDING, WEBRIL, UNDERCAST, STERILE, 4 IN

## (undated) DEVICE — BANDAGE, ELASTIC, MATRIX, SELF-CLOSURE, 6 IN X 5 YD, LF

## (undated) DEVICE — PADDING, WEBRIL, UNDERCAST, STERILE, 6 IN

## (undated) DEVICE — STRAP, ANKLE, DISTRACTOR, GUHL

## (undated) DEVICE — Device

## (undated) DEVICE — GLOVE, SURGICAL, PROTEXIS PI ORTHO, 7.0, PF, LF

## (undated) DEVICE — CUFF, TOURNIQUET, 30 X 4, DUAL PORT/SNGL BLADDER, DISP, LF

## (undated) DEVICE — ADAPTER, Y TUBING STERILE

## (undated) DEVICE — SUTURE, ETHILON, 4-0, 18, PS2, BLACK

## (undated) DEVICE — DRAPE, SHEET, FAN FOLDED, MEDIUM, 44 X 72 IN, DISPOSABLE, LF, STERILE

## (undated) DEVICE — BANDAGE, ESMARK, 6 IN X 9 FT, STERILE

## (undated) DEVICE — SUTURE, VICRYL, 0, 27 IN, CT-2, UNDYED

## (undated) DEVICE — DRAPE, SHEET, U, STERI DRAPE, 47 X 51 IN, DISPOSABLE, STERILE

## (undated) DEVICE — TUBING, NFLOW, FMS VUE, SNGL USE, DISP, LF

## (undated) DEVICE — ARTHROWAND, AMBIENT SUPER MULTIVAC 50

## (undated) DEVICE — BANDAGE, ELASTIC, MATRIX, SELF-CLOSURE, 4 IN X 5 YD, LF

## (undated) DEVICE — GLOVE, SURGICAL, SENSITIVE, GAMMEX, SZ-7, PF, WHITE

## (undated) DEVICE — DRESSING, ABDOMINAL, TENDERSORB, 8 X 10 IN, STERILE

## (undated) DEVICE — APPLICATOR, CHLORAPREP, W/ORANGE TINT, 26ML

## (undated) DEVICE — PREP TRAY, VAGINAL

## (undated) DEVICE — DRAPE, TOWEL, STERI DRAPE, 17 X 11 IN, PLASTIC, STERILE

## (undated) DEVICE — TUBING, OUTFLOW, DRAIN PIPE, W/ONE WAY VALVE (FMS VUE)

## (undated) DEVICE — KIT, DISPOSABLES, FOR DX KNOTLESS FIBERTAK

## (undated) DEVICE — COVER HANDLE LIGHT, STERIS, BLUE, STERILE